# Patient Record
Sex: MALE | Race: WHITE
[De-identification: names, ages, dates, MRNs, and addresses within clinical notes are randomized per-mention and may not be internally consistent; named-entity substitution may affect disease eponyms.]

---

## 2017-11-07 ENCOUNTER — HOSPITAL ENCOUNTER (INPATIENT)
Dept: HOSPITAL 24 - ICU | Age: 67
LOS: 5 days | Discharge: ANOTHER HOSPITAL | DRG: 57 | End: 2017-11-12
Attending: INTERNAL MEDICINE | Admitting: INTERNAL MEDICINE
Payer: COMMERCIAL

## 2017-11-07 ENCOUNTER — HOSPITAL ENCOUNTER (EMERGENCY)
Dept: HOSPITAL 24 - ER | Age: 67
Discharge: HOME | End: 2017-11-07
Payer: COMMERCIAL

## 2017-11-07 VITALS — DIASTOLIC BLOOD PRESSURE: 86 MMHG | SYSTOLIC BLOOD PRESSURE: 149 MMHG

## 2017-11-07 VITALS — BODY MASS INDEX: 30.9 KG/M2

## 2017-11-07 VITALS — BODY MASS INDEX: 29.7 KG/M2

## 2017-11-07 DIAGNOSIS — H02.409: Primary | ICD-10-CM

## 2017-11-07 DIAGNOSIS — G55: ICD-10-CM

## 2017-11-07 DIAGNOSIS — G70.01: Primary | ICD-10-CM

## 2017-11-07 DIAGNOSIS — R26.81: ICD-10-CM

## 2017-11-07 DIAGNOSIS — R26.89: ICD-10-CM

## 2017-11-07 DIAGNOSIS — H02.403: ICD-10-CM

## 2017-11-07 DIAGNOSIS — M47.9: ICD-10-CM

## 2017-11-07 DIAGNOSIS — H53.2: ICD-10-CM

## 2017-11-07 DIAGNOSIS — M47.22: ICD-10-CM

## 2017-11-07 DIAGNOSIS — R06.02: ICD-10-CM

## 2017-11-07 LAB
ALBUMIN SERPL BCP-MCNC: 3.5 G/DL (ref 3.4–5)
ALBUMIN SERPL BCP-MCNC: 3.6 G/DL (ref 3.4–5)
ALP SERPL-CCNC: 83 UNITS/L (ref 46–116)
ALP SERPL-CCNC: 85 UNITS/L (ref 46–116)
ALT SERPL W P-5'-P-CCNC: 20 UNITS/L (ref 12–78)
ALT SERPL W P-5'-P-CCNC: 23 UNITS/L (ref 12–78)
AST SERPL W P-5'-P-CCNC: 16 UNITS/L (ref 15–37)
AST SERPL W P-5'-P-CCNC: 19 UNITS/L (ref 15–37)
BASOPHILS # BLD AUTO: 0.1 X10^3/UL (ref 0–0.1)
BASOPHILS # BLD AUTO: 0.2 X10^3/UL (ref 0–0.1)
BASOPHILS NFR BLD AUTO: 1 % (ref 0.2–1)
BASOPHILS NFR BLD AUTO: 2.2 % (ref 0.2–1)
BUN SERPL-MCNC: 14 MG/DL (ref 7–18)
BUN SERPL-MCNC: 16 MG/DL (ref 7–18)
CALCIUM ALBUM COR SERPL-SCNC: (no result) MG/DL (ref 8.5–10.1)
CALCIUM ALBUM COR SERPL-SCNC: (no result) MG/DL (ref 8.5–10.1)
CALCIUM ALBUM COR SERPL-SCNC: (no result) MMOL/L (ref 136–145)
CALCIUM ALBUM COR SERPL-SCNC: 140 MMOL/L (ref 136–145)
CALCIUM SERPL-MCNC: 8.8 MG/DL (ref 8.5–10.1)
CALCIUM SERPL-MCNC: 8.9 MG/DL (ref 8.5–10.1)
CHLORIDE SERPL-SCNC: 102 MMOL/L (ref 98–107)
CHLORIDE SERPL-SCNC: 103 MMOL/L (ref 98–107)
CO2 SERPL-SCNC: 28.8 MMOL/L (ref 21–32)
CO2 SERPL-SCNC: 29.4 MMOL/L (ref 21–32)
CREAT SERPL-MCNC: 1.22 MG/DL (ref 0.7–1.3)
CREAT SERPL-MCNC: 1.36 MG/DL (ref 0.7–1.3)
CRP SERPL-MCNC: 4.1 MG/L (ref 0–3)
EGFR  BLACK RACES: > 60 (ref 60–?)
EGFR  BLACK RACES: > 60 (ref 60–?)
EOSINOPHIL # BLD AUTO: 0.1 X10^3/UL (ref 0–0.2)
EOSINOPHIL # BLD AUTO: 0.1 X10^3/UL (ref 0–0.2)
EOSINOPHIL NFR BLD AUTO: 1.2 % (ref 0.9–2.9)
EOSINOPHIL NFR BLD AUTO: 1.4 % (ref 0.9–2.9)
ERYTHROCYTE [DISTWIDTH] IN BLOOD BY AUTOMATED COUNT: 13.8 % (ref 11.6–16.5)
ERYTHROCYTE [DISTWIDTH] IN BLOOD BY AUTOMATED COUNT: 14 % (ref 11.6–16.5)
ERYTHROCYTE [SEDIMENTATION RATE] IN BLOOD: 5 MM/HOUR (ref 0–15)
HCT VFR BLD AUTO: 41.3 % (ref 42–54)
HCT VFR BLD AUTO: 42.7 % (ref 42–54)
HGB BLD-MCNC: 14.2 G/DL (ref 13.5–18)
HGB BLD-MCNC: 14.7 G/DL (ref 13.5–18)
LYMPHOCYTES # BLD AUTO: 0.8 X10^3/UL (ref 1.3–2.9)
LYMPHOCYTES # BLD AUTO: 1.1 X10^3/UL (ref 1.3–2.9)
LYMPHOCYTES NFR BLD AUTO: 12.5 % (ref 21–51)
LYMPHOCYTES NFR BLD AUTO: 13.5 % (ref 21–51)
MCH RBC QN AUTO: 29.2 PG (ref 27–34)
MCH RBC QN AUTO: 29.3 PG (ref 27–34)
MCHC RBC AUTO-ENTMCNC: 34.3 G/DL (ref 33–35)
MCHC RBC AUTO-ENTMCNC: 34.5 G/DL (ref 33–35)
MCV RBC AUTO: 85 FL (ref 80–100)
MCV RBC AUTO: 85 FL (ref 80–100)
MONOCYTES # BLD AUTO: 0.5 X10^3/UL (ref 0.3–0.8)
MONOCYTES # BLD AUTO: 0.7 X10^3/UL (ref 0.3–0.8)
MONOCYTES NFR BLD AUTO: 7.7 % (ref 0–13)
MONOCYTES NFR BLD AUTO: 9.2 % (ref 0–13)
NEUTROPHILS # BLD AUTO: 4.9 X10^3/UL (ref 2.2–4.8)
NEUTROPHILS # BLD AUTO: 5.9 X10^3/UL (ref 2.2–4.8)
NEUTROPHILS NFR BLD AUTO: 73.7 % (ref 42–75)
NEUTROPHILS NFR BLD AUTO: 77.6 % (ref 42–75)
PLATELET # BLD: 226 X10^3/UL (ref 150–450)
PLATELET # BLD: 236 X10^3/UL (ref 150–450)
PMV BLD AUTO: 9.6 FL (ref 7.4–11)
PMV BLD AUTO: 9.8 FL (ref 7.4–11)
PROT SERPL-MCNC: 7.7 G/DL (ref 6.4–8.2)
PROT SERPL-MCNC: 8.1 G/DL (ref 6.4–8.2)
RBC # BLD AUTO: 4.86 X10^6/UL (ref 4.7–6)
RBC # BLD AUTO: 5.03 X10^6/UL (ref 4.7–6)
SODIUM SERPL-SCNC: 139 MMOL/L (ref 136–145)
SODIUM SERPL-SCNC: 141 MMOL/L (ref 136–145)
TSH SERPL DL<=0.05 MIU/L-ACNC: 0.65 UIU/ML (ref 0.36–3.74)
WBC NRBC COR # BLD AUTO: 6.4 X10^3/UL (ref 3.6–10)
WBC NRBC COR # BLD AUTO: 8 X10^3/UL (ref 3.6–10)

## 2017-11-07 PROCEDURE — 93010 ELECTROCARDIOGRAM REPORT: CPT

## 2017-11-07 PROCEDURE — 83519 RIA NONANTIBODY: CPT

## 2017-11-07 PROCEDURE — 93005 ELECTROCARDIOGRAM TRACING: CPT

## 2017-11-07 PROCEDURE — 82607 VITAMIN B-12: CPT

## 2017-11-07 PROCEDURE — 70450 CT HEAD/BRAIN W/O DYE: CPT

## 2017-11-07 PROCEDURE — 99283 EMERGENCY DEPT VISIT LOW MDM: CPT

## 2017-11-07 PROCEDURE — 72131 CT LUMBAR SPINE W/O DYE: CPT

## 2017-11-07 PROCEDURE — 80053 COMPREHEN METABOLIC PANEL: CPT

## 2017-11-07 PROCEDURE — 87070 CULTURE OTHR SPECIMN AEROBIC: CPT

## 2017-11-07 PROCEDURE — 85652 RBC SED RATE AUTOMATED: CPT

## 2017-11-07 PROCEDURE — 85025 COMPLETE CBC W/AUTO DIFF WBC: CPT

## 2017-11-07 PROCEDURE — 86255 FLUORESCENT ANTIBODY SCREEN: CPT

## 2017-11-07 PROCEDURE — 74000: CPT

## 2017-11-07 PROCEDURE — 84443 ASSAY THYROID STIM HORMONE: CPT

## 2017-11-07 PROCEDURE — 87205 SMEAR GRAM STAIN: CPT

## 2017-11-07 PROCEDURE — 36415 COLL VENOUS BLD VENIPUNCTURE: CPT

## 2017-11-07 PROCEDURE — 83735 ASSAY OF MAGNESIUM: CPT

## 2017-11-07 PROCEDURE — 72125 CT NECK SPINE W/O DYE: CPT

## 2017-11-07 PROCEDURE — 94640 AIRWAY INHALATION TREATMENT: CPT

## 2017-11-07 PROCEDURE — 81001 URINALYSIS AUTO W/SCOPE: CPT

## 2017-11-07 PROCEDURE — 71010: CPT

## 2017-11-07 PROCEDURE — 97535 SELF CARE MNGMENT TRAINING: CPT

## 2017-11-07 PROCEDURE — 84439 ASSAY OF FREE THYROXINE: CPT

## 2017-11-07 PROCEDURE — 84132 ASSAY OF SERUM POTASSIUM: CPT

## 2017-11-07 PROCEDURE — 86140 C-REACTIVE PROTEIN: CPT

## 2017-11-07 PROCEDURE — 86308 HETEROPHILE ANTIBODY SCREEN: CPT

## 2017-11-07 PROCEDURE — 70552 MRI BRAIN STEM W/DYE: CPT

## 2017-11-07 RX ADMIN — POTASSIUM CHLORIDE PRN MLS/HR: 7.46 INJECTION, SOLUTION INTRAVENOUS at 23:07

## 2017-11-07 RX ADMIN — SODIUM CHLORIDE SCH ML: 9 INJECTION, SOLUTION INTRAMUSCULAR; INTRAVENOUS; SUBCUTANEOUS at 23:07

## 2017-11-07 RX ADMIN — SODIUM CHLORIDE SCH MLS/HR: 9 INJECTION, SOLUTION INTRAVENOUS at 20:57

## 2017-11-07 RX ADMIN — POTASSIUM CHLORIDE PRN MLS/HR: 7.46 INJECTION, SOLUTION INTRAVENOUS at 22:00

## 2017-11-07 NOTE — CT
History: Low back pain and degenerative disc disease.



Exam: Noncontrast CT examination of the lumbar spine.



Technique: Multiple axial CT images of the L-spine were performed with sagittal and coronal reformatt
ed CT images of the lumbar spine without the benefit of IV contrast.



Comparison: No recent priors.



Findings: 



There is mild to moderate lumbar spine spondylosis and lower lumbar facet joint DJD with discal heigh
t loss at the L5-S1 level where there is also facet joint DJD and a vacuum disc phenomenon. There is 
also the suggestion of a right lateral disc protrusion at L5-S1 which is partially calcified which cr
eates severe right sided and severe left-sided foraminal narrowing/stenosis at L5-S1 with probable ne
ural impingement of the exiting nerve roots at this level. This abnormality can be followed up with l
umbar spine MRI imaging for assurance and to evaluate the degree of neural compression, however. Ther
e is no evidence for acute fracture, subluxation, or pars defect. No destructive lytic bony lesion or
 erosive endplate changes are observed there is mild to moderate bilateral SI joint DJD without joint
 erosion seen there is scattered aortic atherosclerosis. No retroperitoneal soft tissue masses or fidel
nopathy is seen. No other lumbar spine abnormalities are identified on this lumbar spine CT examinati
on.



Impression: No acute fracture or compression deformity appreciated. No subluxation seen.



Mild to moderate lumbar spine spondylosis and lower lumbar facet joint DJD with discal height loss at
 the L5-S1 level where there is also facet joint DJD and a vacuum disc phenomenon. There is also the 
suggestion of a right lateral disc protrusion at L5-S1 which is partially calcified which creates sev
ere right sided and severe left-sided foraminal narrowing/stenosis at L5-S1 with probable neural impi
ngement of the exiting nerve roots at this level. This abnormality can be followed up with outpatient
 lumbar spine MRI imaging for assurance and to evaluate the degree of neural compression, however.







Reported By:Electronically Signed by ELIANE FITZGERALD III, MD at 11/7/2017 9:50:22 AM

## 2017-11-07 NOTE — DR.GENAD
HPI





- PCP


Primary Care Physician: MAIN





- Complaint/Symptoms


Chief Complaint Doctors Comments: Patient reports that he has had numbness of 

the upper and lower extremities for several months. Lately the numbness has 

increased.  He has little feelings in the hand at times.  He has been going to 

a chiropractor for alignment and with the assumption that the numbness will 

improve but this has not been the case.  He has drooping to the eye lids that 

have increased. He denies any articulation problems or memory defect.


Chief Complaint:: NUMBNESS





- Source


History Provided: Patient





- Mode of Arrival


Mode of Arrival: Wheelchair





- Timing


Onset of Chief Complaint: 11/04/17





PMH





- PMH


Past Medical History: Yes


Past Medical History: Hypertension


Past Medical History Comment: PT STATES HE THINKS HE HAD A TIA WHEN HE WAS 42


Past Surgical History: Yes


Past Surgical History Comment: EYE SURGERY





- Family History


History of Family Medical Conditions: Yes


Family Medical History: Diabetes Mellitus, Cancer, Coronary Artery Disease, 

Hypertension





- Social History


Does patient currently use any type of tobacco product: No


Have you used tobacco products in the last 12 months: No


Type of Tobacco Use: None


Alcohol Use: None


Do you use any recreational Drugs:: No


Lives With: Spouse


Lives Where: Home





- infectious screening


In the last 2 months have you had wt loss of >10#?: NO


Have you had fever, night sweats or hemotysis?: No


Have you traveled outside the country in the last 6 months?: No


Isolation: Standard





ROS





- Review of Systems


Eyes: No Symptoms Reported


ENTM: No Symptoms Reported


Respiratoy: No Symptoms Reported


Cardiovascular: No Symptoms Reported


Gastrointestinal/Abdominal: No Symptoms Reported


Genitourinary: No Symptoms Reported


Neurological: See HPI, Weakness, Problems Walking


Musculoskeletal: Foot


Integumentary: No Symptoms Reported


Hematologic/Lymphatic: No Symptoms Reported


Endocrine: No Symptoms Reported


Psychiatric: No Symptoms Reported


All Other Systems: Reviewed and Negative





PE





- Vital Signs


Vitals: 


 





Temperature                      97.5 F


Pulse Rate [Apical]              56


Pulse Rate                       61


Respiratory Rate                 22


Blood Pressure [Left Arm]        149/86


Blood Pressure                   131/86


O2 Sat by Pulse Oximetry         95











- General


Limitations: No Limitations


General Appearance: Alert





- Head


Head Exam: Normal Inspection, Atraumatic





- Eyes


Eye exam: Normal Appearance, PERRL, EOMI





- ENT


ENT Exam: Normal Exam


External Ear Exam: Normal External Inspection


TM/Canal Exam: Bilateral Normal


Nose Exam: Normal Nose Exam


Mouth Exam: Normal Inspection


Throat Exam: Normal Inspection





- Neck


Neck Exam: Normal Inspection, Full ROM





- Chest


Chest Inspection: Normal Inspection





- Respiratory


Respiratory Exam: Normal Lung Sounds Bilat


Respiratory Exam: Bilateral Clear to Auscultation





- Cardiovascular


Cardiovascular Exam: Regular Rate, Normal Rhythm





- Abdominal Exam


Abdominal Exam: Normal Inspection


Abdominal Tenderness: negative: RUQ, RLQ, LUQ, LLQ, Epigastrium, Suprapubic, 

Diffuse, Mild, Moderate, Severe, Other





- Extremities


Extremities Exam: Normal Inspection





- Back


Back Exam: Normal Inspection, Full ROM





- Neurologic


Neurological Exam: Alert, Oriented X3, CN II-XII Intact





- Psychiatric


Psychiatric Exam: Normal Affect





- Skin


Skin Exam: Warm, Dry, Intact





ROR





- Labs Reviewed


Result Diagrams: 


 11/07/17 08:02





 11/07/17 08:02


Laboratory: 


 











WBC  6.4 X10^3/uL (3.6-10.0)   11/07/17  08:02    


 


RBC  5.03 X10^6/uL (4.7-6.0)   11/07/17  08:02    


 


Hgb  14.7 g/dL (13.5-18.0)   11/07/17  08:02    


 


Hct  42.7 % (42.0-54.0)   11/07/17  08:02    


 


MCV  85.0 fL (80.0-100.0)   11/07/17  08:02    


 


MCH  29.3 pg (27.0-34.0)   11/07/17  08:02    


 


MCHC  34.5 g/dL (33.0-35.0)   11/07/17  08:02    


 


RDW  14.0 % (11.6-16.5)   11/07/17  08:02    


 


Plt Count  226 X10^3/uL (150.0-450.0)   11/07/17  08:02    


 


MPV  9.8 fL (7.4-11.0)   11/07/17  08:02    


 


Neut %  77.6 % (42.0-75.0)  H  11/07/17  08:02    


 


Lymph %  12.5 % (21.0-51.0)  L  11/07/17  08:02    


 


Mono %  7.7 % (0.0-13.0)   11/07/17  08:02    


 


Eos %  1.2 % (0.9-2.9)   11/07/17  08:02    


 


Baso %  1.0 % (0.2-1.0)   11/07/17  08:02    


 


Neut #  4.9 x10^3/uL (2.2-4.8)  H  11/07/17  08:02    


 


Lymph #  0.8 X10^3/uL (1.3-2.9)  L  11/07/17  08:02    


 


Mono #  0.5 x10^3/uL (0.3-0.8)   11/07/17  08:02    


 


Eos #  0.1 x10^3/uL (0.0-0.2)   11/07/17  08:02    


 


Baso #  0.1 X10^3/uL (0.0-0.1)   11/07/17  08:02    


 


Absolute Nucleated RBC  0.1 /100WBC  11/07/17  08:02    


 


Sodium  139 mmol/L (136-145)   11/07/17  08:02    


 


Corrected Sodium  140 mmol/L (136-145)   11/07/17  08:02    


 


Potassium  3.6 mmol/L (3.5-5.1)   11/07/17  08:02    


 


Chloride  102 mmol/L ()   11/07/17  08:02    


 


Carbon Dioxide  28.8 mmol/L (21-32)   11/07/17  08:02    


 


BUN  14 mg/dL (7-18)   11/07/17  08:02    


 


Creatinine  1.22 mg/dL (0.70-1.30)   11/07/17  08:02    


 


Est GFR (MDRD) Af Amer  > 60  (>60)   11/07/17  08:02    


 


Est GFR (MDRD) Non-Af  > 60  (>60)   11/07/17  08:02    


 


Glucose  134 mg/dL (65-99)  H  11/07/17  08:02    


 


Calcium  8.8 mg/dL (8.5-10.1)   11/07/17  08:02    


 


Corrected Calcium  TNP   11/07/17  08:02    


 


Total Bilirubin  0.50 mg/dL (0.2-1.0)   11/07/17  08:02    


 


AST  19 Units/L (15-37)   11/07/17  08:02    


 


ALT  23 Units/L (12-78)   11/07/17  08:02    


 


Alkaline Phosphatase  85 Units/L ()   11/07/17  08:02    


 


Total Protein  8.1 g/dL (6.4-8.2)   11/07/17  08:02    


 


Albumin  3.6 g/dL (3.4-5.0)   11/07/17  08:02    


 


Globulin  4.5 g/dL (2.5-4.5)   11/07/17  08:02    


 


Albumin/Globulin Ratio  0.8 Ratio (1.1-2.1)  L  11/07/17  08:02    


 


TSH 3rd Generation  0.651 uIU/mL (0.358-3.74)   11/07/17  08:02    














- XRAY


XRAY Interpreted by: Radiologist (CT Brain: o acute intraparenchymal hemorrhage 

or mass can be identified.  No extra-axial fluid collections are seen.  No 

alteration in the attenuation of the brain parenchyma can be identified to 

suggest acute or subacute ischemic change.  The ventricular system is symmetric 

and nondilated..  The extracranial structures are grossly unremarkable. CT 

Cervical Spine: Moderate degenerative changes. CT Lumbar Spine: Mild to 

moderate lumbar spine spondylosis and lower lumbar facet joint DJD with discal 

height loss at the L5-S1 level where there is also facet joint DJD and a vacuum 

disc phenomenon. There is also the suggestion of a right lateral disc 

protrusion at L5-S1 which is partially calcified which creates severe right 

sided and severe left sided foraminal narrowing/stenosis at L5-S1 with prob 

able neural impingement of the exiting nerve roots at this level.)





- Diagnosis


Discharge Problem: 


 Ptosis of eyelid, bilateral, Spondylosis of lumbar spine, L5-S1 nerve root 

impingement





DJD (degenerative joint disease) of cervical spine


Qualifiers:


 Spinal osteoarthritis complication: with radiculopathy Qualified Code(s): 

M47.22 - Other spondylosis with radiculopathy, cervical region








- Discharge Plan


Condition: Stable





- Follow ups/Referrals


Follow ups/Referrals: 


Nagi Rutledge [Primary Care Provider] - 3 days





- Instructions

## 2017-11-07 NOTE — CT
HISTORY: Hypertension, altered mental status, and possible CVA



Study:  CT brain without contrast



Comparison: None



Technique:

Multiple axial images of the brain were obtained from the skull base to the vertex without administra
tion of IV contrast.



Findings: 



No acute intraparenchymal hemorrhage or mass can be identified.  No extra-axial fluid collections are
 seen.  No alteration in the attenuation of the brain parenchyma can be identified to suggest acute o
r subacute ischemic change. The ventricular system is symmetric and nondilated.  The extracranial str
uctures are grossly unremarkable. If the patient's symptoms persist, follow-up brain MRI imaging is s
uggested to exclude an acute CVA.



IMPRESSION:

 

1.  No acute intracranial process can be identified.











Reported By:Electronically Signed by ELIANE FITZGERALD III, MD at 11/7/2017 9:45:22 AM

## 2017-11-07 NOTE — CT
HISTORY: Hypertension, possible TIA 20 years ago. Patient has involuntary muscle movement and jerking
 gradually worsening over 5 days.



Study: CT cervical spine without contrast



Comparison: No priors



Technique: Multiple axial images of the cervical spine were obtained from the skull base to the thora
cic inlet without administration of IV contrast.  Sagittal and coronal reformats were performed and r
eviewed. Dose reduction techniques utilized automatic exposure control.



Findings:



Alignment of the cervical spine is maintained.  No evidence for acute cortical disruption or subluxat
ion can be seen.  The central canal remains free of compromise from bony fragments or significant sof
t tissue encroachment.  The posterior elements appear unremarkable.  The prevertebral soft tissues ar
e normal in their appearance.  In addition, the surrounding paraspinous soft tissues are unremarkable
.Moderate degenerative changes of the cervical spine are incidentally noted.



IMPRESSION:

 

1.  No evidence for traumatic injury of the cervical spine.







Reported By:Electronically Signed by JEANNE MAURICE MD at 11/7/2017 9:59:28 AM

## 2017-11-08 LAB
ALBUMIN SERPL BCP-MCNC: 3.5 G/DL (ref 3.4–5)
ALP SERPL-CCNC: 84 UNITS/L (ref 46–116)
ALT SERPL W P-5'-P-CCNC: 21 UNITS/L (ref 12–78)
AST SERPL W P-5'-P-CCNC: 23 UNITS/L (ref 15–37)
BASOPHILS # BLD AUTO: 0.1 X10^3/UL (ref 0–0.1)
BASOPHILS NFR BLD AUTO: 0.8 % (ref 0.2–1)
BUN SERPL-MCNC: 12 MG/DL (ref 7–18)
CALCIUM ALBUM COR SERPL-SCNC: (no result) MG/DL (ref 8.5–10.1)
CALCIUM ALBUM COR SERPL-SCNC: (no result) MMOL/L (ref 136–145)
CALCIUM SERPL-MCNC: 8.7 MG/DL (ref 8.5–10.1)
CHLORIDE SERPL-SCNC: 104 MMOL/L (ref 98–107)
CO2 SERPL-SCNC: 25.6 MMOL/L (ref 21–32)
CREAT SERPL-MCNC: 1.04 MG/DL (ref 0.7–1.3)
CRP SERPL-MCNC: 5.6 MG/L (ref 0–3)
EGFR  BLACK RACES: > 60 (ref 60–?)
EOSINOPHIL # BLD AUTO: 0.1 X10^3/UL (ref 0–0.2)
EOSINOPHIL NFR BLD AUTO: 0.8 % (ref 0.9–2.9)
ERYTHROCYTE [DISTWIDTH] IN BLOOD BY AUTOMATED COUNT: 14 % (ref 11.6–16.5)
ERYTHROCYTE [SEDIMENTATION RATE] IN BLOOD: 5 MM/HOUR (ref 0–15)
HCT VFR BLD AUTO: 41.9 % (ref 42–54)
HGB BLD-MCNC: 14.6 G/DL (ref 13.5–18)
LYMPHOCYTES # BLD AUTO: 0.7 X10^3/UL (ref 1.3–2.9)
LYMPHOCYTES NFR BLD AUTO: 7.1 % (ref 21–51)
MCH RBC QN AUTO: 29.6 PG (ref 27–34)
MCHC RBC AUTO-ENTMCNC: 34.9 G/DL (ref 33–35)
MCV RBC AUTO: 84.9 FL (ref 80–100)
MONOCYTES # BLD AUTO: 0.6 X10^3/UL (ref 0.3–0.8)
MONOCYTES NFR BLD AUTO: 6.4 % (ref 0–13)
NEUTROPHILS # BLD AUTO: 8.2 X10^3/UL (ref 2.2–4.8)
NEUTROPHILS NFR BLD AUTO: 84.9 % (ref 42–75)
PLATELET # BLD: 219 X10^3/UL (ref 150–450)
PMV BLD AUTO: 10.2 FL (ref 7.4–11)
PROT SERPL-MCNC: 7.9 G/DL (ref 6.4–8.2)
RBC # BLD AUTO: 4.94 X10^6/UL (ref 4.7–6)
SODIUM SERPL-SCNC: 139 MMOL/L (ref 136–145)
T4 FREE SERPL-MCNC: 1.22 NG/DL (ref 0.76–1.46)
TSH SERPL DL<=0.05 MIU/L-ACNC: 0.64 UIU/ML (ref 0.36–3.74)
VIT B12 SERPL-MCNC: 1714 PG/ML (ref 193–986)
WBC NRBC COR # BLD AUTO: 9.7 X10^3/UL (ref 3.6–10)

## 2017-11-08 RX ADMIN — PYRIDOSTIGMINE BROMIDE SCH MG: 60 TABLET ORAL at 20:59

## 2017-11-08 RX ADMIN — AMLODIPINE BESYLATE SCH MG: 10 TABLET ORAL at 10:40

## 2017-11-08 RX ADMIN — SODIUM CHLORIDE SCH: 9 INJECTION, SOLUTION INTRAMUSCULAR; INTRAVENOUS; SUBCUTANEOUS at 08:16

## 2017-11-08 RX ADMIN — SODIUM CHLORIDE SCH MLS/HR: 9 INJECTION, SOLUTION INTRAVENOUS at 20:59

## 2017-11-08 RX ADMIN — SODIUM CHLORIDE SCH MLS/HR: 9 INJECTION, SOLUTION INTRAVENOUS at 08:30

## 2017-11-08 RX ADMIN — ACETAMINOPHEN PRN MG: 325 TABLET, FILM COATED ORAL at 10:40

## 2017-11-08 RX ADMIN — PYRIDOSTIGMINE BROMIDE SCH MG: 60 TABLET ORAL at 10:40

## 2017-11-08 RX ADMIN — LOSARTAN POTASSIUM AND HYDROCHLOROTHIAZIDE SCH TAB: 50; 12.5 TABLET, FILM COATED ORAL at 10:40

## 2017-11-08 RX ADMIN — POTASSIUM CHLORIDE PRN MLS/HR: 7.46 INJECTION, SOLUTION INTRAVENOUS at 08:30

## 2017-11-08 RX ADMIN — POTASSIUM CHLORIDE PRN MLS/HR: 7.46 INJECTION, SOLUTION INTRAVENOUS at 10:43

## 2017-11-08 RX ADMIN — PYRIDOSTIGMINE BROMIDE SCH MG: 60 TABLET ORAL at 15:18

## 2017-11-08 RX ADMIN — SODIUM CHLORIDE SCH: 9 INJECTION, SOLUTION INTRAMUSCULAR; INTRAVENOUS; SUBCUTANEOUS at 21:12

## 2017-11-08 RX ADMIN — POTASSIUM CHLORIDE PRN MLS/HR: 7.46 INJECTION, SOLUTION INTRAVENOUS at 00:18

## 2017-11-08 RX ADMIN — SODIUM CHLORIDE SCH: 9 INJECTION, SOLUTION INTRAMUSCULAR; INTRAVENOUS; SUBCUTANEOUS at 15:10

## 2017-11-08 NOTE — MRI
STUDY: MRI OF THE BRAIN WITHOUT AND WITH GADOLINIUM



HISTORY:  Acute diplopia. Uncontrollable involuntary severe jerking of limbs with numbness and tingli
ng throughout the entire body.



Technique: Multiplanar multi-sequence MRI of the brain was obtained utilizing standard departmental p
rotocol. Sagittal and axial T1, axial T2, FLAIR, diffusion (DWI/ADC) images through the brain were pe
rformed. 



20 cc of Omniscan was administered intravenously without reported complication following acquisition 
of informed written consent. Post gadolinium axial and coronal T1 weighted images were also performed
 and reviewed.



Comparison: Head CT from November 7, 2017.



Findings:



Pre gadolinium brain: The sulci, cisterns and ventricles are prominent consistent with mild diffuse v
olume loss. There are confluent and scattered foci of T2 prolongation in the periventricular and subc
ortical white matter of both hemispheres. This is a nonspecific finding which likely represents micro
angiopathic change in a patient of this age. 



There is a punctate focus of decreased diffusion versus T2 shine through in the right post central gy
jayshree near the vertex. There is no evidence of acute hemorrhage, mass, mass effect, or midline shift. T
here are no abnormal intra-axial or extra-axial fluid collections.



The major intracranial vascular flow voids appear intact. The vertebral arteries are codominant. 



Post gadolinium brain: Following the uneventful administration of intravenous gadolinium, there is no
 evidence of abnormal parenchymal or leptomeningeal enhancement.



IMPRESSION:

 

1.  Punctate focus of decreased diffusion versus T2 shine through in the superior medial aspect of th
e post central gyrus on the right. This may represent an evolving acute to subacute infarct. Clinical
 correlation for infarct timing is recommended.



2.  Nonspecific white matter change and volume loss.







Reported By:Electronically Signed by JOSE LUIS CONNOR MD at 11/8/2017 11:55:55 AM

## 2017-11-09 LAB
ALBUMIN SERPL BCP-MCNC: 3.4 G/DL (ref 3.4–5)
ALP SERPL-CCNC: 79 UNITS/L (ref 46–116)
ALT SERPL W P-5'-P-CCNC: 26 UNITS/L (ref 12–78)
AST SERPL W P-5'-P-CCNC: 54 UNITS/L (ref 15–37)
BASOPHILS # BLD AUTO: 0.1 X10^3/UL (ref 0–0.1)
BASOPHILS NFR BLD AUTO: 0.7 % (ref 0.2–1)
BUN SERPL-MCNC: 18 MG/DL (ref 7–18)
CALCIUM ALBUM COR SERPL-SCNC: (no result) MG/DL (ref 8.5–10.1)
CALCIUM ALBUM COR SERPL-SCNC: 142 MMOL/L (ref 136–145)
CALCIUM SERPL-MCNC: 9.3 MG/DL (ref 8.5–10.1)
CHLORIDE SERPL-SCNC: 105 MMOL/L (ref 98–107)
CO2 SERPL-SCNC: 22.1 MMOL/L (ref 21–32)
CREAT SERPL-MCNC: 1.2 MG/DL (ref 0.7–1.3)
EGFR  BLACK RACES: > 60 (ref 60–?)
EOSINOPHIL # BLD AUTO: 0 X10^3/UL (ref 0–0.2)
EOSINOPHIL NFR BLD AUTO: 0 % (ref 0.9–2.9)
ERYTHROCYTE [DISTWIDTH] IN BLOOD BY AUTOMATED COUNT: 14.1 % (ref 11.6–16.5)
HCT VFR BLD AUTO: 42.7 % (ref 42–54)
HGB BLD-MCNC: 14.6 G/DL (ref 13.5–18)
LYMPHOCYTES # BLD AUTO: 0.4 X10^3/UL (ref 1.3–2.9)
LYMPHOCYTES NFR BLD AUTO: 2.3 % (ref 21–51)
MCH RBC QN AUTO: 29 PG (ref 27–34)
MCHC RBC AUTO-ENTMCNC: 34.1 G/DL (ref 33–35)
MCV RBC AUTO: 85 FL (ref 80–100)
MONOCYTES # BLD AUTO: 0.4 X10^3/UL (ref 0.3–0.8)
MONOCYTES NFR BLD AUTO: 2.3 % (ref 0–13)
NEUTROPHILS # BLD AUTO: 16.5 X10^3/UL (ref 2.2–4.8)
NEUTROPHILS NFR BLD AUTO: 94.7 % (ref 42–75)
NEUTS BAND NFR BLD: 2 % (ref 0–10)
PLAT MORPH BLD: NORMAL
PLATELET # BLD: 242 X10^3/UL (ref 150–450)
PMV BLD AUTO: 10.1 FL (ref 7.4–11)
PROT SERPL-MCNC: 7.9 G/DL (ref 6.4–8.2)
RBC # BLD AUTO: 5.02 X10^6/UL (ref 4.7–6)
RBC MORPH BLD: NORMAL
SODIUM SERPL-SCNC: 140 MMOL/L (ref 136–145)
WBC NRBC COR # BLD AUTO: 17.4 X10^3/UL (ref 3.6–10)

## 2017-11-09 RX ADMIN — PYRIDOSTIGMINE BROMIDE SCH MG: 60 TABLET ORAL at 17:31

## 2017-11-09 RX ADMIN — LOSARTAN POTASSIUM AND HYDROCHLOROTHIAZIDE SCH TAB: 50; 12.5 TABLET, FILM COATED ORAL at 09:31

## 2017-11-09 RX ADMIN — PYRIDOSTIGMINE BROMIDE SCH MG: 60 TABLET ORAL at 05:23

## 2017-11-09 RX ADMIN — PYRIDOSTIGMINE BROMIDE SCH MG: 60 TABLET ORAL at 20:14

## 2017-11-09 RX ADMIN — ACETAMINOPHEN PRN MG: 325 TABLET, FILM COATED ORAL at 12:40

## 2017-11-09 RX ADMIN — SODIUM CHLORIDE SCH: 9 INJECTION, SOLUTION INTRAMUSCULAR; INTRAVENOUS; SUBCUTANEOUS at 05:30

## 2017-11-09 RX ADMIN — SODIUM CHLORIDE SCH MLS/HR: 9 INJECTION, SOLUTION INTRAVENOUS at 15:31

## 2017-11-09 RX ADMIN — SODIUM CHLORIDE SCH: 9 INJECTION, SOLUTION INTRAMUSCULAR; INTRAVENOUS; SUBCUTANEOUS at 14:01

## 2017-11-09 RX ADMIN — SODIUM CHLORIDE SCH MLS/HR: 9 INJECTION, SOLUTION INTRAVENOUS at 23:34

## 2017-11-09 RX ADMIN — PYRIDOSTIGMINE BROMIDE SCH MG: 60 TABLET ORAL at 23:29

## 2017-11-09 RX ADMIN — SODIUM CHLORIDE SCH: 9 INJECTION, SOLUTION INTRAMUSCULAR; INTRAVENOUS; SUBCUTANEOUS at 22:09

## 2017-11-09 RX ADMIN — AMLODIPINE BESYLATE SCH MG: 10 TABLET ORAL at 09:32

## 2017-11-09 RX ADMIN — PYRIDOSTIGMINE BROMIDE SCH MG: 60 TABLET ORAL at 12:40

## 2017-11-09 NOTE — DR.H&P
H&P





- History & Physical for Day of:


H&P Date: 11/07/17





- Chief Complaint


Chief Complaint: acute diplopia, weakness





- Allergies


Allergies/Adverse Reactions: 


 Allergies











Allergy/AdvReac Type Severity Reaction Status Date / Time


 


No Known Drug Allergies Allergy   Verified 11/07/17 08:33














- History of Present Illness


History of Present Illness:  is a 67 year old patient of ours who is a 

direct admission from our office.  He presented with complaints of double vision

, unsteady balance, drooping of the eyelids and inability to keep them open, 

tingling of the upper and lower extremities with associated weakness, inability 

to hold head erect, and shortness of breath. On examination, bilateral lungs 

are noted with course rhonchi. Subcostal retractions noted. Abdomen is round, 

soft, and non-tender with normal bowel sounds in all quadrants. Patient is 

noted with moderate weakness to bilateral upper and lower extremities. Patient 

was noted with 2/5 power when head/neck flexion and extension was examined. 

Patient reports diplopia at rest. Ptosis is noted at rest and continues after 

sustained upgaze and repeated blinking. Patient reports being seen in the ER 

earlier this morning. A brain CT was obtained in the ER and reported no acute 

intracranial process. A CT of the cervical spine reported no evidence for 

traumatic injury of the cervical spine. A Lumbar spine CT revealed mild to 

moderate lumbar spine spnodylosis and lower lumbar facet joint DJD with discal 

height loss at the L5-S1 level where there is also facet joint DJD and vacuum 

disc phenomenon. There is also the suggestion of a right lateral disc 

protrusion at L5-S1 which is partially calcified which creates severe right 

sided and left sided foraminal narrowing/stenosis at L5-S1 with probable neural 

impingement of the exiting nerve roots at this level. We admitted patient to 

the hospital for acute diplopia. We will obtain a CBC, CMP, ESR, AND CRP on 

admission and repeat in the morning. We plan to obtain a brain MRI with 

contrast in the morning.





- Past Medical History


Past Medical History: Hypertension





- Past Surgical History


Surgical History: No History





- Family History


Family Medical History: Diabetes Mellitus, Cancer, Coronary Artery Disease, 

Hypertension





- Social History


Does patient currently use any type of tobacco product: No


Have you used tobacco products in the last 12 months: No


Type of Tobacco Use: None


Alcohol Use: None


Drug Use: None





- Review of Systems


Constitutional: No Symptoms Reported, Weakness.  denies: Fever, Chills, Sweats, 

Malaise, Other


Eyes: See HPI, Vision Change, Other (ptosis bilateral eyelids )


ENT: Nose Discharge, Nose Congestion.  denies: Ear Pain, Ear Discharge, Nose 

Pain, Mouth Pain, Mouth Swelling, Throat Pain, Throat Swelling


Respiratory: See HPI, Shortness of Breath, SOB with Excertion.  denies: 

Hemoptysis, Sputum, Wheezing


Cardiovascular: No Symptoms Reported.  denies: Chest Pain, Palpitations, 

Orthopnea, Paroxysmal Noc. Dyspnea, Edema, Light Headedness, Other


Gastrointestinal: No Symptoms Reported.  denies: Nausea, Vomiting, Abdominal 

Pain, Diarrhea, Constipation, Melena, Hematochezia, Other


Genitourinary: No Symptoms Reported.  denies: Dysuria, Frequency, Incontinence, 

Hematuria, Retention, Other


Musculoskeletal: Other (bilateral upper and lower extremity weakness, unstable 

gait )


Skin: No Symptoms Reported.  denies: Rash, Lesions, Jaundice, Bruising, Wound, 

Ecchymosis, Other


Neurological: See HPI, Weakness, Numbness, Incoordination





- Physical Exam


Vital Signs: 


 





Temperature                      98.4 F


Pulse Rate [Left Brachial]       79


Pulse Rate                       63


Respiratory Rate                 28


Blood Pressure [Left Arm]        149/78


Blood Pressure                   149/86


O2 Sat by Pulse Oximetry         96








Oriented: Normal


Eyes: Blurred Vision, Diplopia, Other (ptosis )


Ear: Normal.  negative: Right, Left, Swelling, Ecchymosis, Hemotypanum, Abrasion

, Laceration


Nose: Discharge.  negative: Normal, Injected, Blood, Other


Throat: Normal


Respiratory: Rhonchi Throughout


Cardiovascular: Normal.  negative: Tachycardia, Bradycardia, Irregular, S3, S4, 

Murmur, Edema, Other


: Normal.  negative: Dysuria, Hematuria, Frequency, Discharge, Testicular Pain

, Bleeding, Pregnant, Other


Auscultation: Bowel Sounds: Normal


Palpation: Normal


Tenderness: Normal.  negative: Rebound, Guarding, Rigidity


Skin: Normal


Musculoskeletal: Right, Left, Arm, Leg, Back:Lumbar, Tender, Instability


Psychiatric: Normal


Mood Description: Calm


Affect: Normal


Speech Pattern: Clear





- Assessment/Plan


(1) Diplopia


Status: Acute   


Plan: BRAIN MRI WITH CONTRAST, CONTINUE TO MONITOR   





(2) Ptosis of eyelid, bilateral


Status: Acute   


Plan: BRAIN MRI WITH CONTRAST, CONTINUE TO MONITOR   





(3) Weakness generalized


Status: Acute   


Plan: BRAIN MRI WITH CONTRAST, CONTINUE TO MONITOR

## 2017-11-10 LAB
ALBUMIN SERPL BCP-MCNC: 3.3 G/DL (ref 3.4–5)
ALP SERPL-CCNC: 74 UNITS/L (ref 46–116)
ALT SERPL W P-5'-P-CCNC: 56 UNITS/L (ref 12–78)
AST SERPL W P-5'-P-CCNC: 106 UNITS/L (ref 15–37)
BASOPHILS # BLD AUTO: 0 X10^3/UL (ref 0–0.1)
BASOPHILS NFR BLD AUTO: 0.1 % (ref 0.2–1)
BUN SERPL-MCNC: 25 MG/DL (ref 7–18)
CALCIUM ALBUM COR SERPL-SCNC: 145 MMOL/L (ref 136–145)
CALCIUM ALBUM COR SERPL-SCNC: 9.7 MG/DL (ref 8.5–10.1)
CALCIUM SERPL-MCNC: 9.1 MG/DL (ref 8.5–10.1)
CHLORIDE SERPL-SCNC: 107 MMOL/L (ref 98–107)
CO2 SERPL-SCNC: 28 MMOL/L (ref 21–32)
CREAT SERPL-MCNC: 1.21 MG/DL (ref 0.7–1.3)
EGFR  BLACK RACES: > 60 (ref 60–?)
EOSINOPHIL # BLD AUTO: 0 X10^3/UL (ref 0–0.2)
EOSINOPHIL NFR BLD AUTO: 0 % (ref 0.9–2.9)
ERYTHROCYTE [DISTWIDTH] IN BLOOD BY AUTOMATED COUNT: 14.2 % (ref 11.6–16.5)
HCT VFR BLD AUTO: 42.4 % (ref 42–54)
HGB BLD-MCNC: 14.2 G/DL (ref 13.5–18)
LYMPHOCYTES # BLD AUTO: 0.3 X10^3/UL (ref 1.3–2.9)
LYMPHOCYTES NFR BLD AUTO: 1.8 % (ref 21–51)
MCH RBC QN AUTO: 28.9 PG (ref 27–34)
MCHC RBC AUTO-ENTMCNC: 33.6 G/DL (ref 33–35)
MCV RBC AUTO: 86 FL (ref 80–100)
MONOCYTES # BLD AUTO: 0.6 X10^3/UL (ref 0.3–0.8)
MONOCYTES NFR BLD AUTO: 3.1 % (ref 0–13)
NEUTROPHILS # BLD AUTO: 17.5 X10^3/UL (ref 2.2–4.8)
NEUTROPHILS NFR BLD AUTO: 95 % (ref 42–75)
NEUTS BAND NFR BLD: 2 % (ref 0–10)
PLAT MORPH BLD: NORMAL
PLATELET # BLD: 229 X10^3/UL (ref 150–450)
PMV BLD AUTO: 10.4 FL (ref 7.4–11)
PROT SERPL-MCNC: 7.6 G/DL (ref 6.4–8.2)
RBC # BLD AUTO: 4.93 X10^6/UL (ref 4.7–6)
RBC MORPH BLD: NORMAL
SODIUM SERPL-SCNC: 144 MMOL/L (ref 136–145)
WBC NRBC COR # BLD AUTO: 18.4 X10^3/UL (ref 3.6–10)

## 2017-11-10 RX ADMIN — SODIUM CHLORIDE SCH: 9 INJECTION, SOLUTION INTRAMUSCULAR; INTRAVENOUS; SUBCUTANEOUS at 17:15

## 2017-11-10 RX ADMIN — PYRIDOSTIGMINE BROMIDE SCH: 60 TABLET ORAL at 03:14

## 2017-11-10 RX ADMIN — PYRIDOSTIGMINE BROMIDE SCH MG: 60 TABLET ORAL at 09:42

## 2017-11-10 RX ADMIN — PYRIDOSTIGMINE BROMIDE SCH MG: 60 TABLET ORAL at 20:45

## 2017-11-10 RX ADMIN — SODIUM CHLORIDE SCH MLS/HR: 9 INJECTION, SOLUTION INTRAVENOUS at 20:46

## 2017-11-10 RX ADMIN — LOSARTAN POTASSIUM AND HYDROCHLOROTHIAZIDE SCH TAB: 50; 12.5 TABLET, FILM COATED ORAL at 09:43

## 2017-11-10 RX ADMIN — AMLODIPINE BESYLATE SCH MG: 10 TABLET ORAL at 09:43

## 2017-11-10 RX ADMIN — PYRIDOSTIGMINE BROMIDE SCH MG: 60 TABLET ORAL at 12:57

## 2017-11-10 RX ADMIN — PYRIDOSTIGMINE BROMIDE SCH MG: 60 TABLET ORAL at 20:44

## 2017-11-10 RX ADMIN — SODIUM CHLORIDE SCH: 9 INJECTION, SOLUTION INTRAVENOUS at 13:38

## 2017-11-10 RX ADMIN — SODIUM CHLORIDE SCH: 9 INJECTION, SOLUTION INTRAMUSCULAR; INTRAVENOUS; SUBCUTANEOUS at 06:06

## 2017-11-10 RX ADMIN — PYRIDOSTIGMINE BROMIDE SCH MG: 60 TABLET ORAL at 17:15

## 2017-11-10 NOTE — PCM.PROG
Progress Note





- Progress Note for Day of


Date: 11/08/17





- Subjective


Subjective:  WAS ADMITTED WITH ACUTE DIPLOPIA, UNSTEADY BALANCE, 

WEAKNESS, AND PTOSIS. TODAY, HE IS ALERT AND ORIENTED, LYING IN BED ON MORNING 

ROUNDS. PATIENT'S FAMILY IS AT BEDSIDE. PATIENT CONTINUES WITH MODERATED 

WEAKNESS TO UPPER AND LOWER EXTREMITIES. HE CONTINUES TO BE UNABLE TO HOLD HEAD 

ERECT. HE CONTINUES WITH INABILITY TO HOLD EYELIDS OPEN UNLESS HELD UP BY TAPE. 

PATIENT REPORTS DOUBLE VISION. LUNGS CONTINUE WITH COURSE RHONCHI. HE CONTINUES 

TO COMPLAIN OF SHORTNESS OF BREATH. HIS VITAL SIGNS THIS MORNING ARE 98.4-62-16-

99%-154/82. ABNORMAL LAB VALUES THIS MORNING INCLUDE THE FOLLOWING: HCT 41.9, 

POTASSIUM 3.4, GLUCOSE 103, CRP 5.60, A/G RATIO 0.8, VITAMIN B12 1714. WE 

OBTAINED A BRAIN MRI WITH CONTRAST TODAY. IT REPORTED PUNCTATE FOCUS OF 

DECREASED DIFFUSION VERSUS T2 SHINE THROUGH IN THE SUPERIOR MEDIAL ASPECT OF 

THE POST CENTRAL GYRUS ON THE RIGHT. THIS MAY REPRESENT AN EVOLVING ACUTE TO 

SUBACUTE INFARCT. CLINICAL CORRELATION FOR INFARCT TIMING IS RECOMMENDED. 

NONSPECIFIC WHITE MATTER CHANGE AND VOLUME LOSS. WE BELIEVE THAT PATIENT IS 

EXHIBITING SIGNS AND SYMPTOMS OF MYASTHENIA GRAVIS. TODAY, WE PLAN TO START 

SOLUMEDROL 125MG IV Q8H AND MESTINON 60MG TID. WE WILL CHECK A MYASTHENIA 

GRAVIS PANEL AND AN VALE TITER. OTHERWISE, WE WILL CONTINUE TO MONITOR PATIENT 

AND FOLLOW UP WITH AM LABS.





- Past Medical Family Social History


Past Med/Fam/Surg Hx: No changes since H&P


Allergies: 


Allergies





No Known Drug Allergies Allergy (Verified 11/07/17 08:33)


 











- Review of Systems


ROS: No change since H&P





- Vital Signs and I&O's


Vital Signs: 


 





Temperature                      98.2 F


Pulse Rate [Left Brachial]       68


Pulse Rate                       63


Respiratory Rate                 22


Blood Pressure [Left Arm]        150/60


Blood Pressure                   149/86


O2 Sat by Pulse Oximetry         96








Intake and Output: 


 Intake & Output











 11/07/17 11/08/17 11/09/17 11/10/17





 11:59 11:59 11:59 11:59


 


Intake Total  1624 3812 1795


 


Output Total  950 120 


 


Balance  674 3692 1795














- Physical Exam


Oriented: Normal


Eyes: Blurred Vision, Diplopia, Other (ptosis )


Ear: Normal.  negative: Right, Left, Swelling, Ecchymosis, Hemotypanum, Abrasion

, Laceration


Nose: Discharge.  negative: Normal, Injected, Blood, Other


Throat: Normal


Respiratory: Right, Left, Generalized, Rhonchi


Cardiovascular: Normal.  negative: Tachycardia, Bradycardia, Irregular, S3, S4, 

Murmur, Edema, Other


: Normal.  negative: Dysuria, Hematuria, Frequency, Discharge, Testicular Pain

, Bleeding, Pregnant, Other


Auscultation: Bowel Sounds: Normal


Palpation: Normal


Tenderness: Normal.  negative: Rebound, Guarding, Rigidity


Skin: Normal


Musculoskeletal: Right, Left, Arm, Leg, Back:Lumbar, Tender, Instability


Psychiatric: Normal


Mood Description: Calm


Affect: Normal


Speech Pattern: Clear, Appropriate





- Laboratory and Diagnostics


Result Diagrams: 


 11/12/17 04:00





 11/12/17 04:00


Labs: 


 Laboratory











WBC  18.4 X10^3/uL (3.6-10.0)  H  11/10/17  04:45    


 


RBC  4.93 X10^6/uL (4.7-6.0)   11/10/17  04:45    


 


Hgb  14.2 g/dL (13.5-18.0)   11/10/17  04:45    


 


Hct  42.4 % (42.0-54.0)   11/10/17  04:45    


 


MCV  86.0 fL (80.0-100.0)   11/10/17  04:45    


 


MCH  28.9 pg (27.0-34.0)   11/10/17  04:45    


 


MCHC  33.6 g/dL (33.0-35.0)   11/10/17  04:45    


 


RDW  14.2 % (11.6-16.5)   11/10/17  04:45    


 


Plt Count  229 X10^3/uL (150.0-450.0)   11/10/17  04:45    


 


Plt Count Comment  Adequate  (ADEQUATE)   11/10/17  04:45    


 


MPV  10.4 fL (7.4-11.0)   11/10/17  04:45    


 


Neut %  95.0 % (42.0-75.0)  H  11/10/17  04:45    


 


Lymph %  1.8 % (21.0-51.0)  L  11/10/17  04:45    


 


Mono %  3.1 % (0.0-13.0)   11/10/17  04:45    


 


Eos %  0.0 % (0.9-2.9)  L  11/10/17  04:45    


 


Baso %  0.1 % (0.2-1.0)  L  11/10/17  04:45    


 


Neut #  17.5 x10^3/uL (2.2-4.8)  H  11/10/17  04:45    


 


Lymph #  0.3 X10^3/uL (1.3-2.9)  L  11/10/17  04:45    


 


Mono #  0.6 x10^3/uL (0.3-0.8)   11/10/17  04:45    


 


Eos #  0.0 x10^3/uL (0.0-0.2)   11/10/17  04:45    


 


Baso #  0.0 X10^3/uL (0.0-0.1)   11/10/17  04:45    


 


Absolute Nucleated RBC  0.0 /100WBC  11/10/17  04:45    


 


Total Counted  100   11/10/17  04:45    


 


Neutrophils % (Manual)  93 % (39-76)  H  11/10/17  04:45    


 


Band Neutrophils %  2 % (0-10)   11/10/17  04:45    


 


Lymphocytes % (Manual)  5 % (13-43)  L  11/10/17  04:45    


 


Plt Morphology Comment  Normal  (NORMAL)   11/10/17  04:45    


 


RBC Morphology  Normal  (NORMAL)   11/10/17  04:45    


 


ESR  5 MM/HOUR (0-15)   11/08/17  05:23    


 


Sodium  144 mmol/L (136-145)   11/10/17  04:45    


 


Corrected Sodium  145 mmol/L (136-145)   11/10/17  04:45    


 


Potassium  3.4 mmol/L (3.5-5.1)  L  11/10/17  04:45    


 


Chloride  107 mmol/L ()   11/10/17  04:45    


 


Carbon Dioxide  28.0 mmol/L (21-32)   11/10/17  04:45    


 


BUN  25 mg/dL (7-18)  H  11/10/17  04:45    


 


Creatinine  1.21 mg/dL (0.70-1.30)   11/10/17  04:45    


 


Est GFR (MDRD) Af Amer  > 60  (>60)   11/10/17  04:45    


 


Est GFR (MDRD) Non-Af  > 60  (>60)   11/10/17  04:45    


 


Glucose  146 mg/dL (65-99)  H  11/10/17  04:45    


 


Calcium  9.1 mg/dL (8.5-10.1)   11/10/17  04:45    


 


Corrected Calcium  9.7 mg/dL (8.5-10.1)   11/10/17  04:45    


 


Magnesium  1.8 mg/dL (1.7-2.9)   11/07/17  20:28    


 


Total Bilirubin  0.30 mg/dL (0.2-1.0)   11/10/17  04:45    


 


AST  106 Units/L (15-37)  H  11/10/17  04:45    


 


ALT  56 Units/L (12-78)   11/10/17  04:45    


 


Alkaline Phosphatase  74 Units/L ()   11/10/17  04:45    


 


C-Reactive Protein  5.60 mg/L (0-3.0)  H  11/08/17  05:23    


 


Total Protein  7.6 g/dL (6.4-8.2)   11/10/17  04:45    


 


Albumin  3.3 g/dL (3.4-5.0)  L  11/10/17  04:45    


 


Globulin  4.3 g/dL (2.5-4.5)   11/10/17  04:45    


 


Albumin/Globulin Ratio  0.8 Ratio (1.1-2.1)  L  11/10/17  04:45    


 


Vitamin B12  1714 pg/mL (193-986)  H  11/08/17  05:23    


 


Free T4  1.22 ng/dL (0.76-1.46)   11/08/17  05:23    


 


TSH 3rd Generation  0.643 uIU/mL (0.358-3.74)   11/08/17  05:23    


 


Acetylcholine Recept Ab  Cancelled   11/07/17  20:14    














- Plan


(1) Myasthenia gravis with (acute) exacerbation


Status: Suspected   


Plan: SOLU-MEDROL 125MG IV Q8H, MESTINON 60MG PO TID, CONTINUE TO MONITOR   





(2) Diplopia


Status: Acute   


Plan: CONTINUE TO MONITOR   





(3) Ptosis of eyelid, bilateral


Status: Acute   


Plan: CONTINUE TO MONITOR   





(4) Weakness generalized


Status: Acute   


Plan: PHYSICAL THERAPY, CONTINUE TO MONITOR

## 2017-11-11 LAB
ALBUMIN SERPL BCP-MCNC: 3.2 G/DL (ref 3.4–5)
ALP SERPL-CCNC: 72 UNITS/L (ref 46–116)
ALT SERPL W P-5'-P-CCNC: 76 UNITS/L (ref 12–78)
ANA PAT SER-IMP: (no result)
AST SERPL W P-5'-P-CCNC: 91 UNITS/L (ref 15–37)
BASOPHILS # BLD AUTO: 0 X10^3/UL (ref 0–0.1)
BASOPHILS NFR BLD AUTO: 0.1 % (ref 0.2–1)
BUN SERPL-MCNC: 27 MG/DL (ref 7–18)
CALCIUM ALBUM COR SERPL-SCNC: 143 MMOL/L (ref 136–145)
CALCIUM ALBUM COR SERPL-SCNC: 9.3 MG/DL (ref 8.5–10.1)
CALCIUM SERPL-MCNC: 8.7 MG/DL (ref 8.5–10.1)
CHLORIDE SERPL-SCNC: 104 MMOL/L (ref 98–107)
CO2 SERPL-SCNC: 29 MMOL/L (ref 21–32)
CREAT SERPL-MCNC: 1.12 MG/DL (ref 0.7–1.3)
EGFR  BLACK RACES: > 60 (ref 60–?)
EOSINOPHIL # BLD AUTO: 0 X10^3/UL (ref 0–0.2)
EOSINOPHIL NFR BLD AUTO: 0 % (ref 0.9–2.9)
ERYTHROCYTE [DISTWIDTH] IN BLOOD BY AUTOMATED COUNT: 14.2 % (ref 11.6–16.5)
HCT VFR BLD AUTO: 40.9 % (ref 42–54)
HGB BLD-MCNC: 14 G/DL (ref 13.5–18)
LYMPHOCYTES # BLD AUTO: 0.3 X10^3/UL (ref 1.3–2.9)
LYMPHOCYTES NFR BLD AUTO: 2 % (ref 21–51)
MAGNESIUM SERPL-MCNC: 2.3 MG/DL (ref 1.7–2.9)
MCH RBC QN AUTO: 29.1 PG (ref 27–34)
MCHC RBC AUTO-ENTMCNC: 34.4 G/DL (ref 33–35)
MCV RBC AUTO: 84.7 FL (ref 80–100)
MONOCYTES # BLD AUTO: 0.5 X10^3/UL (ref 0.3–0.8)
MONOCYTES NFR BLD AUTO: 3.4 % (ref 0–13)
NEUTROPHILS # BLD AUTO: 14.9 X10^3/UL (ref 2.2–4.8)
NEUTROPHILS NFR BLD AUTO: 94.5 % (ref 42–75)
NEUTS BAND NFR BLD: 2 % (ref 0–10)
PLAT MORPH BLD: NORMAL
PLATELET # BLD: 253 X10^3/UL (ref 150–450)
PMV BLD AUTO: 10.4 FL (ref 7.4–11)
PROT SERPL-MCNC: 7.1 G/DL (ref 6.4–8.2)
RBC # BLD AUTO: 4.82 X10^6/UL (ref 4.7–6)
RBC MORPH BLD: NORMAL
SODIUM SERPL-SCNC: 142 MMOL/L (ref 136–145)
WBC NRBC COR # BLD AUTO: 15.7 X10^3/UL (ref 3.6–10)

## 2017-11-11 RX ADMIN — POTASSIUM CHLORIDE PRN MLS/HR: 7.46 INJECTION, SOLUTION INTRAVENOUS at 12:16

## 2017-11-11 RX ADMIN — POTASSIUM CHLORIDE PRN MLS/HR: 7.46 INJECTION, SOLUTION INTRAVENOUS at 23:34

## 2017-11-11 RX ADMIN — SODIUM CHLORIDE SCH: 9 INJECTION, SOLUTION INTRAVENOUS at 05:36

## 2017-11-11 RX ADMIN — PYRIDOSTIGMINE BROMIDE SCH MG: 60 TABLET ORAL at 20:33

## 2017-11-11 RX ADMIN — POTASSIUM CHLORIDE PRN MLS/HR: 7.46 INJECTION, SOLUTION INTRAVENOUS at 14:13

## 2017-11-11 RX ADMIN — LOSARTAN POTASSIUM AND HYDROCHLOROTHIAZIDE SCH TAB: 50; 12.5 TABLET, FILM COATED ORAL at 09:11

## 2017-11-11 RX ADMIN — SODIUM CHLORIDE SCH: 9 INJECTION, SOLUTION INTRAVENOUS at 16:18

## 2017-11-11 RX ADMIN — PYRIDOSTIGMINE BROMIDE SCH MG: 60 TABLET ORAL at 09:10

## 2017-11-11 RX ADMIN — PYRIDOSTIGMINE BROMIDE SCH MG: 60 TABLET ORAL at 18:08

## 2017-11-11 RX ADMIN — POTASSIUM CHLORIDE PRN MLS/HR: 7.46 INJECTION, SOLUTION INTRAVENOUS at 10:45

## 2017-11-11 RX ADMIN — SODIUM CHLORIDE SCH: 9 INJECTION, SOLUTION INTRAMUSCULAR; INTRAVENOUS; SUBCUTANEOUS at 05:36

## 2017-11-11 RX ADMIN — PYRIDOSTIGMINE BROMIDE SCH MG: 60 TABLET ORAL at 14:09

## 2017-11-11 RX ADMIN — ACETAMINOPHEN PRN MG: 325 TABLET, FILM COATED ORAL at 22:39

## 2017-11-11 RX ADMIN — SODIUM CHLORIDE SCH ML: 9 INJECTION, SOLUTION INTRAMUSCULAR; INTRAVENOUS; SUBCUTANEOUS at 22:37

## 2017-11-11 RX ADMIN — POTASSIUM CHLORIDE PRN MLS/HR: 7.46 INJECTION, SOLUTION INTRAVENOUS at 22:38

## 2017-11-11 RX ADMIN — SODIUM CHLORIDE SCH: 9 INJECTION, SOLUTION INTRAMUSCULAR; INTRAVENOUS; SUBCUTANEOUS at 16:18

## 2017-11-11 RX ADMIN — SODIUM CHLORIDE SCH: 9 INJECTION, SOLUTION INTRAMUSCULAR; INTRAVENOUS; SUBCUTANEOUS at 01:36

## 2017-11-11 RX ADMIN — SODIUM CHLORIDE SCH MLS/HR: 9 INJECTION, SOLUTION INTRAVENOUS at 14:09

## 2017-11-11 RX ADMIN — AMLODIPINE BESYLATE SCH MG: 10 TABLET ORAL at 09:11

## 2017-11-11 RX ADMIN — POTASSIUM CHLORIDE PRN MLS/HR: 7.46 INJECTION, SOLUTION INTRAVENOUS at 09:15

## 2017-11-12 VITALS — DIASTOLIC BLOOD PRESSURE: 71 MMHG | SYSTOLIC BLOOD PRESSURE: 154 MMHG

## 2017-11-12 LAB
ACHR BIND AB SER-SCNC: 0.2 NMOL/L
ALBUMIN SERPL BCP-MCNC: 2.9 G/DL (ref 3.4–5)
ALP SERPL-CCNC: 60 UNITS/L (ref 46–116)
ALT SERPL W P-5'-P-CCNC: 102 UNITS/L (ref 12–78)
AST SERPL W P-5'-P-CCNC: 86 UNITS/L (ref 15–37)
BACTERIA URNS QL MICRO: NEGATIVE /HPF
BASOPHILS # BLD AUTO: 0 X10^3/UL (ref 0–0.1)
BASOPHILS NFR BLD AUTO: 0.1 % (ref 0.2–1)
BILIRUB UR QL STRIP.AUTO: NEGATIVE
BUN SERPL-MCNC: 23 MG/DL (ref 7–18)
CALCIUM ALBUM COR SERPL-SCNC: 144 MMOL/L (ref 136–145)
CALCIUM ALBUM COR SERPL-SCNC: 9.2 MG/DL (ref 8.5–10.1)
CALCIUM SERPL-MCNC: 8.3 MG/DL (ref 8.5–10.1)
CHLORIDE SERPL-SCNC: 106 MMOL/L (ref 98–107)
CLARITY UR: CLEAR
CO2 SERPL-SCNC: 34 MMOL/L (ref 21–32)
COLOR UR AUTO: (no result)
CREAT SERPL-MCNC: 0.92 MG/DL (ref 0.7–1.3)
EGFR  BLACK RACES: > 60 (ref 60–?)
EOSINOPHIL # BLD AUTO: 0 X10^3/UL (ref 0–0.2)
EOSINOPHIL NFR BLD AUTO: 0 % (ref 0.9–2.9)
ERYTHROCYTE [DISTWIDTH] IN BLOOD BY AUTOMATED COUNT: 14.1 % (ref 11.6–16.5)
GLUCOSE UR QL STRIP.AUTO: NEGATIVE
HCT VFR BLD AUTO: 39.9 % (ref 42–54)
HGB BLD-MCNC: 13.7 G/DL (ref 13.5–18)
KETONES UR QL STRIP.AUTO: NEGATIVE
LEUKOCYTE ESTERASE UR QL STRIP.AUTO: NEGATIVE
LYMPHOCYTES # BLD AUTO: 0.2 X10^3/UL (ref 1.3–2.9)
LYMPHOCYTES NFR BLD AUTO: 1.7 % (ref 21–51)
MCH RBC QN AUTO: 29.1 PG (ref 27–34)
MCHC RBC AUTO-ENTMCNC: 34.2 G/DL (ref 33–35)
MCV RBC AUTO: 85.1 FL (ref 80–100)
MONOCYTES # BLD AUTO: 0.5 X10^3/UL (ref 0.3–0.8)
MONOCYTES NFR BLD AUTO: 4.5 % (ref 0–13)
NEUTROPHILS # BLD AUTO: 9.9 X10^3/UL (ref 2.2–4.8)
NEUTROPHILS NFR BLD AUTO: 93.7 % (ref 42–75)
NITRITE UR QL STRIP.AUTO: NEGATIVE
PH UR STRIP.AUTO: 6 [PH] (ref 5–8)
PLAT MORPH BLD: NORMAL
PLATELET # BLD: 238 X10^3/UL (ref 150–450)
PMV BLD AUTO: 10.6 FL (ref 7.4–11)
PROT SERPL-MCNC: 6.5 G/DL (ref 6.4–8.2)
PROT UR QL STRIP.AUTO: NEGATIVE
RBC # BLD AUTO: 4.69 X10^6/UL (ref 4.7–6)
RBC # UR STRIP.AUTO: (no result) /UL
RBC #/AREA URNS HPF: (no result) /HPF
RBC MORPH BLD: NORMAL
SODIUM SERPL-SCNC: 143 MMOL/L (ref 136–145)
SP GR UR STRIP.AUTO: 1.02 (ref 1–1.03)
SQUAMOUS URNS QL MICRO: NEGATIVE /HPF
UROBILINOGEN UR QL STRIP.AUTO: NORMAL
WBC NRBC COR # BLD AUTO: 10.6 X10^3/UL (ref 3.6–10)

## 2017-11-12 RX ADMIN — POTASSIUM CHLORIDE PRN MLS/HR: 7.46 INJECTION, SOLUTION INTRAVENOUS at 06:20

## 2017-11-12 RX ADMIN — HYDROCODONE BITARTRATE AND ACETAMINOPHEN PRN TAB: 5; 325 TABLET ORAL at 10:28

## 2017-11-12 RX ADMIN — LOSARTAN POTASSIUM AND HYDROCHLOROTHIAZIDE SCH TAB: 50; 12.5 TABLET, FILM COATED ORAL at 08:14

## 2017-11-12 RX ADMIN — POTASSIUM CHLORIDE PRN MLS/HR: 7.46 INJECTION, SOLUTION INTRAVENOUS at 00:58

## 2017-11-12 RX ADMIN — HYDROCODONE BITARTRATE AND ACETAMINOPHEN PRN TAB: 5; 325 TABLET ORAL at 03:05

## 2017-11-12 RX ADMIN — ACETAMINOPHEN PRN MG: 325 TABLET, FILM COATED ORAL at 08:15

## 2017-11-12 RX ADMIN — AMLODIPINE BESYLATE SCH MG: 10 TABLET ORAL at 08:15

## 2017-11-12 RX ADMIN — SODIUM CHLORIDE SCH MLS/HR: 9 INJECTION, SOLUTION INTRAVENOUS at 03:05

## 2017-11-12 RX ADMIN — SODIUM CHLORIDE SCH ML: 9 INJECTION, SOLUTION INTRAMUSCULAR; INTRAVENOUS; SUBCUTANEOUS at 06:03

## 2017-11-12 RX ADMIN — PYRIDOSTIGMINE BROMIDE SCH MG: 60 TABLET ORAL at 08:15

## 2017-11-12 NOTE — RAD
Indication: Cough



Exam: Portable AP chest.



Findings: The heart is normal. The pulmonary vessels are normal. The lungs are hypoinflated there is 
overlying EKG leads and tubing artifact. No consolidation or effusion is seen.



Impression: Hypoinflation and overlying artifact limiting the exam with no acute abnormality seen.



Reported By:Electronically Signed by ASHKAN CONRAD MD at 11/12/2017 6:19:46 AM

## 2017-11-12 NOTE — PCM.PROG
Progress Note





- Progress Note for Day of


Date: 11/09/17





- Subjective


Subjective:  WAS ADMITTED WITH ACUTE DIPLOPIA, UNSTEADY BALANCE, 

WEAKNESS, AND PTOSIS. TODAY, HE IS ALERT AND ORIENTED, LYING IN BED ON MORNING 

ROUNDS. PATIENT'S FAMILY IS AT BEDSIDE. PATIENT CONTINUES WITH WEAKNESS TO 

UPPER AND LOWER EXTREMITIES, HOWEVER, SIGNIFICANT IMPROVEMENT IS NOTED SINCE 

YESTERDAY. PATIENT IS ABLE TO HOLD HEAD ERECT AND HAS GOOD USE AND CONTROL OF 

NECK WHEREAS HE DIDNT YESTERDAY. HE CONTINUES WITH PTOSIS, BUT REPORTS THAT 

DOUBLE VISION HAS RESOLVED. LUNGS CONTINUE WITH COURSE RHONCHI BILATERALLY TO 

AUSCULTATION. HE CONTINUES WITH SHORTNESS OF BREATH. HIS VITAL SIGNS THIS 

MORNING ARE 98.4-83-44-94%-160/69. ABNORMAL LAB VALUES THIS MORNING INCLUDE THE 

FOLLOWING: WBC 17.4, GLUCOSE 179, AST 54, A/G RATIO 0.8. VALE PANEL AND 

MYASTHENIA GRAVIS PANEL ARE PENDING. WE CONTINUE TO BELIEVE THAT PATIENT IS 

EXHIBITING SIGNS AND SYMPTOMS OF ACUTE MYASTHENIA GRAVIS, HOWEVER, WE ARE 

WAITING ON MYASTHENIA GRAVIS PANEL TO CONFIRM. TODAY, WE PLAN TO INCREASE 

SOLUMEDROL 125MG IV Q6H AND INCREASE MESTINON 60MG TO Q4H. OTHERWISE, WE WILL 

CONTINUE TO MONITOR PATIENT AND FOLLOW UP WITH AM LABS.





- Past Medical Family Social History


Past Med/Fam/Surg Hx: No changes since H&P


Allergies: 


Allergies





No Known Drug Allergies Allergy (Verified 11/07/17 08:33)


 











- Review of Systems


ROS: No change since H&P





- Vital Signs and I&O's


Vital Signs: 


 





Temperature                      98.7 F


Pulse Rate [Left Brachial]       67


Pulse Rate                       80


Respiratory Rate                 32


Blood Pressure [Left Arm]        154/71


Blood Pressure                   149/86


O2 Sat by Pulse Oximetry         98








Intake and Output: 


 Intake & Output











 11/10/17 11/11/17 11/12/17 11/13/17





 11:59 11:59 11:59 11:59


 


Intake Total 1795 4094 3540 


 


Output Total  225 3730 


 


Balance 1795 3869 -190 














- Physical Exam


Oriented: Normal


Eyes: Blurred Vision, Diplopia, Other (ptosis )


Ear: Normal.  negative: Right, Left, Swelling, Ecchymosis, Hemotypanum, Abrasion

, Laceration


Nose: Discharge.  negative: Normal, Injected, Blood, Other


Throat: Normal


Respiratory: Right, Left, Generalized, Rhonchi


Cardiovascular: Normal.  negative: Tachycardia, Bradycardia, Irregular, S3, S4, 

Murmur, Edema, Other


: Normal.  negative: Dysuria, Hematuria, Frequency, Discharge, Testicular Pain

, Bleeding, Pregnant, Other


Auscultation: Bowel Sounds: Normal


Palpation: Normal


Tenderness: Normal.  negative: Rebound, Guarding, Rigidity


Skin: Normal


Musculoskeletal: Right, Left, Arm, Leg, Back:Lumbar, Tender, Instability


Psychiatric: Normal


Mood Description: Calm


Affect: Normal


Speech Pattern: Clear, Appropriate





- Laboratory and Diagnostics


Result Diagrams: 


 11/12/17 04:00





 11/12/17 04:00


Labs: 


 





11/11/17 20:39   Sputum - Expectorated Sputum   Sputum Culture - Preliminary


11/11/17 20:39   Sputum - Expectorated Sputum    - Final





 Laboratory











WBC  10.6 X10^3/uL (3.6-10.0)  H  11/12/17  04:00    


 


RBC  4.69 X10^6/uL (4.7-6.0)  L  11/12/17  04:00    


 


Hgb  13.7 g/dL (13.5-18.0)   11/12/17  04:00    


 


Hct  39.9 % (42.0-54.0)  L  11/12/17  04:00    


 


MCV  85.1 fL (80.0-100.0)   11/12/17  04:00    


 


MCH  29.1 pg (27.0-34.0)   11/12/17  04:00    


 


MCHC  34.2 g/dL (33.0-35.0)   11/12/17  04:00    


 


RDW  14.1 % (11.6-16.5)   11/12/17  04:00    


 


Plt Count  238 X10^3/uL (150.0-450.0)   11/12/17  04:00    


 


Plt Count Comment  Adequate  (ADEQUATE)   11/12/17  04:00    


 


MPV  10.6 fL (7.4-11.0)   11/12/17  04:00    


 


Neut %  93.7 % (42.0-75.0)  H  11/12/17  04:00    


 


Lymph %  1.7 % (21.0-51.0)  L  11/12/17  04:00    


 


Mono %  4.5 % (0.0-13.0)   11/12/17  04:00    


 


Eos %  0.0 % (0.9-2.9)  L  11/12/17  04:00    


 


Baso %  0.1 % (0.2-1.0)  L  11/12/17  04:00    


 


Neut #  9.9 x10^3/uL (2.2-4.8)  H  11/12/17  04:00    


 


Lymph #  0.2 X10^3/uL (1.3-2.9)  L  11/12/17  04:00    


 


Mono #  0.5 x10^3/uL (0.3-0.8)   11/12/17  04:00    


 


Eos #  0.0 x10^3/uL (0.0-0.2)   11/12/17  04:00    


 


Baso #  0.0 X10^3/uL (0.0-0.1)   11/12/17  04:00    


 


Absolute Nucleated RBC  0.0 /100WBC  11/12/17  04:00    


 


Total Counted  100   11/12/17  04:00    


 


Neutrophils % (Manual)  92 % (39-76)  H  11/12/17  04:00    


 


Band Neutrophils %  2 % (0-10)   11/11/17  04:50    


 


Lymphocytes % (Manual)  5 % (13-43)  L  11/12/17  04:00    


 


Monocytes % (Manual)  3 % (4-9)  L  11/12/17  04:00    


 


Plt Morphology Comment  Normal  (NORMAL)   11/12/17  04:00    


 


RBC Morphology  Normal  (NORMAL)   11/12/17  04:00    


 


ESR  5 MM/HOUR (0-15)   11/08/17  05:23    


 


Sodium  143 mmol/L (136-145)   11/12/17  04:00    


 


Corrected Sodium  144 mmol/L (136-145)   11/12/17  04:00    


 


Potassium  3.2 mmol/L (3.5-5.1)  L  11/12/17  04:00    


 


Chloride  106 mmol/L ()   11/12/17  04:00    


 


Carbon Dioxide  34.0 mmol/L (21-32)  H  11/12/17  04:00    


 


BUN  23 mg/dL (7-18)  H  11/12/17  04:00    


 


Creatinine  0.92 mg/dL (0.70-1.30)   11/12/17  04:00    


 


Est GFR (MDRD) Af Amer  > 60  (>60)   11/12/17  04:00    


 


Est GFR (MDRD) Non-Af  > 60  (>60)   11/12/17  04:00    


 


Glucose  147 mg/dL (65-99)  H  11/12/17  04:00    


 


Calcium  8.3 mg/dL (8.5-10.1)  L  11/12/17  04:00    


 


Corrected Calcium  9.2 mg/dL (8.5-10.1)   11/12/17  04:00    


 


Magnesium  2.3 mg/dL (1.7-2.9)   11/11/17  04:50    


 


Total Bilirubin  0.50 mg/dL (0.2-1.0)   11/12/17  04:00    


 


AST  86 Units/L (15-37)  H  11/12/17  04:00    


 


ALT  102 Units/L (12-78)  H  11/12/17  04:00    


 


Alkaline Phosphatase  60 Units/L ()   11/12/17  04:00    


 


C-Reactive Protein  5.60 mg/L (0-3.0)  H  11/08/17  05:23    


 


Total Protein  6.5 g/dL (6.4-8.2)   11/12/17  04:00    


 


Albumin  2.9 g/dL (3.4-5.0)  L  11/12/17  04:00    


 


Globulin  3.6 g/dL (2.5-4.5)   11/12/17  04:00    


 


Albumin/Globulin Ratio  0.8 Ratio (1.1-2.1)  L  11/12/17  04:00    


 


Vitamin B12  1714 pg/mL (193-986)  H  11/08/17  05:23    


 


Free T4  1.22 ng/dL (0.76-1.46)   11/08/17  05:23    


 


TSH 3rd Generation  0.643 uIU/mL (0.358-3.74)   11/08/17  05:23    


 


Specimen Type  Catherized urine   11/12/17  00:59    


 


Urine Color  Pale yellow  (YELLOW)   11/12/17  00:59    


 


Urine Appearance  Clear  (CLEAR)   11/12/17  00:59    


 


Urine pH  6.0  (5.0 - 8.0)   11/12/17  00:59    


 


Ur Specific Gravity  1.020  (1.000-1.030)   11/12/17  00:59    


 


Urine Protein  Negative  (NEGATIVE)   11/12/17  00:59    


 


Urine Glucose (UA)  Negative  (NEGATIVE)   11/12/17  00:59    


 


Urine Ketones  Negative  (NEGATIVE)   11/12/17  00:59    


 


Urine Occult Blood  2+  (NEGATIVE)   11/12/17  00:59    


 


Urine Nitrite  Negative  (NEGATIVE)   11/12/17  00:59    


 


Urine Bilirubin  Negative  (NEGATIVE)   11/12/17  00:59    


 


Urine Urobilinogen  Normal  (NORMAL)   11/12/17  00:59    


 


Ur Leukocyte Esterase  Negative  (NEGATIVE)   11/12/17  00:59    


 


Urine RBC  Rare /HPF (NEGATIVE)   11/12/17  00:59    


 


Urine WBC  None seen /HPF (NEGATIVE)   11/12/17  00:59    


 


Ur Squamous Epith Cells  Negative /HPF (NEGATIVE)   11/12/17  00:59    


 


Urine Bacteria  Negative /HPF (NEGATIVE)   11/12/17  00:59    


 


Ur Culture Indicated?  No/not indicated   11/12/17  00:59    


 


VALE Screen  None detected  (None Detected)   11/08/17  05:23    


 


VALE Titer  TNP   11/08/17  05:23    


 


VALE Pattern  TNP   11/08/17  05:23    


 


AChR Muscle Binding Ab  0.2   11/08/17  05:23    


 


Striated Muscle IgG Ab  <1:40   11/08/17  05:23    


 


Acetylcholine Recept Ab  Cancelled   11/07/17  20:14    














- Plan


(1) Myasthenia gravis with (acute) exacerbation


Status: Suspected   


Plan: SOLU-MEDROL 125MG IV Q6H, MESTINON 60MG PO Q4H, CONTINUE TO MONITOR   





(2) Diplopia


Status: Acute   


Plan: CONTINUE TO MONITOR   





(3) Ptosis of eyelid, bilateral


Status: Acute   


Plan: CONTINUE TO MONITOR   





(4) Weakness generalized


Status: Acute   


Plan: PHYSICAL THERAPY, CONTINUE TO MONITOR

## 2017-11-12 NOTE — RAD
Examination: Portable KUB



History: Abdominal distention



Findings: There is moderately severe gaseous distention of the colon. Gas is present in the rectosigm
oid. The underlying visceral structures are largely obscured. There is no obvious mass or pathologic 
calcification or ascites.



Impression: Significant colon distention which may represent primary colon ileus. A distal obstructio
n is a possibility and should be clinically considered. Follow-up suggested.



Reported By:Electronically Signed by JAEL MIRANDA MD at 11/12/2017 6:18:46 AM

## 2017-11-12 NOTE — PCM.PROG
Progress Note





- Progress Note for Day of


Date: 11/10/17





- Subjective


Subjective:  WAS ADMITTED WITH ACUTE DIPLOPIA, UNSTEADY BALANCE, 

WEAKNESS, AND PTOSIS. TODAY, HE IS ALERT AND ORIENTED, LYING IN BED ON MORNING 

ROUNDS. PATIENT'S FAMILY IS AT BEDSIDE. PATIENT CONTINUES WITH WEAKNESS TO 

UPPER AND LOWER EXTREMITIES, WHICH APPEAR TO HAVE WORSENED AGAIN. PATIENT DOES 

NOT HAVE AS MUCH CONTROL OVER NECK MUSCLES AS HE DID YESTERDAY. HE CONTINUES 

WITH PTOSIS, BUT CONTINUES TO DENY DOUBLE VISION TODAY. STAFF REPORTS THAT 

AROUND 02:50 THIS MORNING, MUSCLE TWITCHING APPEARED TO BE WORSENING AND THAT 

PATIENT WAS UNABLE TO WALK TO THE BATHROOM, WHEREAS HE WAS ABLE TO LAST NIGHT 

AND YESTERDAY. PATIENT WAS UNABLE TO STAND AND REPORTS THAT HE WOULD LIKE TO 

SPEAK TO US BEFORE TAKING ANYMORE MEDICATIONS. PATIENT REPORTS THAT HE FELT AS 

IF THE MEDICATION WAS MORE BENEFICIAL TO HIM BEFORE WE INCREASED THE FREQUENCY 

OF THE MESTINON. LUNGS CONTINUE WITH COURSE RHONCHI BILATERALLY TO 

AUSCULTATION. HE CONTINUES WITH SHORTNESS OF BREATH. HIS VITAL SIGNS THIS 

MORNING ARE 98.2-68-22-96%-150/60. ABNORMAL LAB VALUES THIS MORNING INCLUDE THE 

FOLLOWING: WBC 18.4, POTASSIUM 3.4, BUN 25, GLUCOSE 146, , ALBUMIN 3.3, A

/G RATIO 0.8. VALE PANEL AND MYASTHENIA GRAVIS PANEL ARE PENDING. TODAY, WE PLAN 

TO DECREASE THE MESTINON TO 30MG PO QID. OTHERWISE, WE WILL CONTINUE TO MONITOR 

PATIENT AND FOLLOW UP WITH AM LABS.





- Past Medical Family Social History


Past Med/Fam/Surg Hx: No changes since H&P


Allergies: 


Allergies





No Known Drug Allergies Allergy (Verified 11/07/17 08:33)


 











- Review of Systems


ROS: No change since H&P





- Vital Signs and I&O's


Vital Signs: 


 





Temperature                      98.7 F


Pulse Rate [Left Brachial]       67


Pulse Rate                       80


Respiratory Rate                 32


Blood Pressure [Left Arm]        154/71


Blood Pressure                   149/86


O2 Sat by Pulse Oximetry         98








Intake and Output: 


 Intake & Output











 11/10/17 11/11/17 11/12/17 11/13/17





 11:59 11:59 11:59 11:59


 


Intake Total 1795 4094 3540 


 


Output Total  225 3730 


 


Balance 1795 3869 -190 














- Physical Exam


Oriented: Normal


Eyes: Blurred Vision, Diplopia, Other (ptosis )


Ear: Normal.  negative: Right, Left, Swelling, Ecchymosis, Hemotypanum, Abrasion

, Laceration


Nose: Discharge.  negative: Normal, Injected, Blood, Other


Throat: Normal


Respiratory: Right, Left, Generalized, Rhonchi


Cardiovascular: Normal.  negative: Tachycardia, Bradycardia, Irregular, S3, S4, 

Murmur, Edema, Other


: Normal.  negative: Dysuria, Hematuria, Frequency, Discharge, Testicular Pain

, Bleeding, Pregnant, Other


Auscultation: Bowel Sounds: Normal


Palpation: Normal


Tenderness: Normal.  negative: Rebound, Guarding, Rigidity


Skin: Normal


Musculoskeletal: Right, Left, Arm, Leg, Back:Lumbar, Tender, Instability


Psychiatric: Normal


Mood Description: Calm


Affect: Normal


Speech Pattern: Clear, Appropriate





- Laboratory and Diagnostics


Result Diagrams: 


 11/12/17 04:00





 11/12/17 04:00


Labs: 


 





11/11/17 20:39   Sputum - Expectorated Sputum   Sputum Culture - Preliminary


11/11/17 20:39   Sputum - Expectorated Sputum    - Final





 Laboratory











WBC  10.6 X10^3/uL (3.6-10.0)  H  11/12/17  04:00    


 


RBC  4.69 X10^6/uL (4.7-6.0)  L  11/12/17  04:00    


 


Hgb  13.7 g/dL (13.5-18.0)   11/12/17  04:00    


 


Hct  39.9 % (42.0-54.0)  L  11/12/17  04:00    


 


MCV  85.1 fL (80.0-100.0)   11/12/17  04:00    


 


MCH  29.1 pg (27.0-34.0)   11/12/17  04:00    


 


MCHC  34.2 g/dL (33.0-35.0)   11/12/17  04:00    


 


RDW  14.1 % (11.6-16.5)   11/12/17  04:00    


 


Plt Count  238 X10^3/uL (150.0-450.0)   11/12/17  04:00    


 


Plt Count Comment  Adequate  (ADEQUATE)   11/12/17  04:00    


 


MPV  10.6 fL (7.4-11.0)   11/12/17  04:00    


 


Neut %  93.7 % (42.0-75.0)  H  11/12/17  04:00    


 


Lymph %  1.7 % (21.0-51.0)  L  11/12/17  04:00    


 


Mono %  4.5 % (0.0-13.0)   11/12/17  04:00    


 


Eos %  0.0 % (0.9-2.9)  L  11/12/17  04:00    


 


Baso %  0.1 % (0.2-1.0)  L  11/12/17  04:00    


 


Neut #  9.9 x10^3/uL (2.2-4.8)  H  11/12/17  04:00    


 


Lymph #  0.2 X10^3/uL (1.3-2.9)  L  11/12/17  04:00    


 


Mono #  0.5 x10^3/uL (0.3-0.8)   11/12/17  04:00    


 


Eos #  0.0 x10^3/uL (0.0-0.2)   11/12/17  04:00    


 


Baso #  0.0 X10^3/uL (0.0-0.1)   11/12/17  04:00    


 


Absolute Nucleated RBC  0.0 /100WBC  11/12/17  04:00    


 


Total Counted  100   11/12/17  04:00    


 


Neutrophils % (Manual)  92 % (39-76)  H  11/12/17  04:00    


 


Band Neutrophils %  2 % (0-10)   11/11/17  04:50    


 


Lymphocytes % (Manual)  5 % (13-43)  L  11/12/17  04:00    


 


Monocytes % (Manual)  3 % (4-9)  L  11/12/17  04:00    


 


Plt Morphology Comment  Normal  (NORMAL)   11/12/17  04:00    


 


RBC Morphology  Normal  (NORMAL)   11/12/17  04:00    


 


ESR  5 MM/HOUR (0-15)   11/08/17  05:23    


 


Sodium  143 mmol/L (136-145)   11/12/17  04:00    


 


Corrected Sodium  144 mmol/L (136-145)   11/12/17  04:00    


 


Potassium  3.2 mmol/L (3.5-5.1)  L  11/12/17  04:00    


 


Chloride  106 mmol/L ()   11/12/17  04:00    


 


Carbon Dioxide  34.0 mmol/L (21-32)  H  11/12/17  04:00    


 


BUN  23 mg/dL (7-18)  H  11/12/17  04:00    


 


Creatinine  0.92 mg/dL (0.70-1.30)   11/12/17  04:00    


 


Est GFR (MDRD) Af Amer  > 60  (>60)   11/12/17  04:00    


 


Est GFR (MDRD) Non-Af  > 60  (>60)   11/12/17  04:00    


 


Glucose  147 mg/dL (65-99)  H  11/12/17  04:00    


 


Calcium  8.3 mg/dL (8.5-10.1)  L  11/12/17  04:00    


 


Corrected Calcium  9.2 mg/dL (8.5-10.1)   11/12/17  04:00    


 


Magnesium  2.3 mg/dL (1.7-2.9)   11/11/17  04:50    


 


Total Bilirubin  0.50 mg/dL (0.2-1.0)   11/12/17  04:00    


 


AST  86 Units/L (15-37)  H  11/12/17  04:00    


 


ALT  102 Units/L (12-78)  H  11/12/17  04:00    


 


Alkaline Phosphatase  60 Units/L ()   11/12/17  04:00    


 


C-Reactive Protein  5.60 mg/L (0-3.0)  H  11/08/17  05:23    


 


Total Protein  6.5 g/dL (6.4-8.2)   11/12/17  04:00    


 


Albumin  2.9 g/dL (3.4-5.0)  L  11/12/17  04:00    


 


Globulin  3.6 g/dL (2.5-4.5)   11/12/17  04:00    


 


Albumin/Globulin Ratio  0.8 Ratio (1.1-2.1)  L  11/12/17  04:00    


 


Vitamin B12  1714 pg/mL (193-986)  H  11/08/17  05:23    


 


Free T4  1.22 ng/dL (0.76-1.46)   11/08/17  05:23    


 


TSH 3rd Generation  0.643 uIU/mL (0.358-3.74)   11/08/17  05:23    


 


Specimen Type  Catherized urine   11/12/17  00:59    


 


Urine Color  Pale yellow  (YELLOW)   11/12/17  00:59    


 


Urine Appearance  Clear  (CLEAR)   11/12/17  00:59    


 


Urine pH  6.0  (5.0 - 8.0)   11/12/17  00:59    


 


Ur Specific Gravity  1.020  (1.000-1.030)   11/12/17  00:59    


 


Urine Protein  Negative  (NEGATIVE)   11/12/17  00:59    


 


Urine Glucose (UA)  Negative  (NEGATIVE)   11/12/17  00:59    


 


Urine Ketones  Negative  (NEGATIVE)   11/12/17  00:59    


 


Urine Occult Blood  2+  (NEGATIVE)   11/12/17  00:59    


 


Urine Nitrite  Negative  (NEGATIVE)   11/12/17  00:59    


 


Urine Bilirubin  Negative  (NEGATIVE)   11/12/17  00:59    


 


Urine Urobilinogen  Normal  (NORMAL)   11/12/17  00:59    


 


Ur Leukocyte Esterase  Negative  (NEGATIVE)   11/12/17  00:59    


 


Urine RBC  Rare /HPF (NEGATIVE)   11/12/17  00:59    


 


Urine WBC  None seen /HPF (NEGATIVE)   11/12/17  00:59    


 


Ur Squamous Epith Cells  Negative /HPF (NEGATIVE)   11/12/17  00:59    


 


Urine Bacteria  Negative /HPF (NEGATIVE)   11/12/17  00:59    


 


Ur Culture Indicated?  No/not indicated   11/12/17  00:59    


 


VALE Screen  None detected  (None Detected)   11/08/17  05:23    


 


VALE Titer  TNP   11/08/17  05:23    


 


VALE Pattern  TNP   11/08/17  05:23    


 


AChR Muscle Binding Ab  0.2   11/08/17  05:23    


 


Striated Muscle IgG Ab  <1:40   11/08/17  05:23    


 


Acetylcholine Recept Ab  Cancelled   11/07/17  20:14    














- Plan


(1) Myasthenia gravis with (acute) exacerbation


Status: Suspected   


Plan: SOLU-MEDROL 125MG IV Q6H, MESTINON 30MG PO QID, CONTINUE TO MONITOR   





(2) Diplopia


Status: Acute   


Plan: CONTINUE TO MONITOR   





(3) Ptosis of eyelid, bilateral


Status: Acute   


Plan: CONTINUE TO MONITOR   





(4) Weakness generalized


Status: Acute   


Plan: PHYSICAL THERAPY, CONTINUE TO MONITOR

## 2017-11-13 NOTE — PCM.PROG
Progress Note





- Progress Note for Day of


Date: 11/12/17





- Subjective


Subjective:  WAS ADMITTED WITH ACUTE DIPLOPIA, UNSTEADY BALANCE, 

WEAKNESS, AND PTOSIS. TODAY, HE IS ALERT AND ORIENTED, LYING IN BED ON MORNING 

ROUNDS. PATIENT'S FAMILY IS AT BEDSIDE. PATIENT CONTINUES WITH WEAKNESS TO 

UPPER AND LOWER EXTREMITIES, WHICH APPEAR TO HAVE SLIGHTLY IMPROVED SINCE 

YESTERDAY. PATIENT APPEARS TO HAVE REGAINED SOME STRENGTH AND CONTROL OF NECK. 

HE CONTINUES WITH PTOSIS, BUT CONTINUES TO DENY DOUBLE VISION TODAY. MUSCLE 

TWITCHING HAS DECREASED SINCE YESTERDAY. ON EXAMINATION, LUNGS CONTINUE WITH 

COURSE RHONCHI BILATERALLY TO AUSCULTATION. HE CONTINUES WITH SHORTNESS OF 

BREATH. HIS VITAL SIGNS THIS MORNING ARE 97.4-80-%-185/82. ABNORMAL LAB 

VALUES THIS MORNING INCLUDE THE FOLLOWING: WBC 15.7, HCT 40.9, POTASSIUM 3.1, 

BUN 27, GLUCOSE 135, AST 91, ALBUMIN 3.2, A/G RATIO 0.8. VALE PANEL AND 

MYASTHENIA GRAVIS PANEL ARE PENDING. TODAY, WE PLAN TO INCREASE THE MESTINON 

AGAIN TO 60MG PO QID. OTHERWISE, WE WILL CONTINUE TO MONITOR PATIENT AND FOLLOW 

UP WITH AM LABS. WE WILL CONTINUE TO HAVE PHYSICAL THERAPY WORK WITH PATIENT. 

IF PATIENT IS NOT NOTED WITH SIGNIFICANT IMPROVEMENT BY TOMORROWS ROUNDS, WE 

MAY CONSIDER TRANSFERRING PATIENT FOR A SECOND OPINION.





- Past Medical Family Social History


Past Med/Fam/Surg Hx: No changes since H&P


Allergies: 


Allergies





No Known Drug Allergies Allergy (Verified 11/07/17 08:33)


 











- Review of Systems


ROS: No change since H&P





- Vital Signs and I&O's


Vital Signs: 


 





Temperature                      98.7 F


Pulse Rate [Left Brachial]       67


Pulse Rate                       80


Respiratory Rate                 32


Blood Pressure [Left Arm]        154/71


Blood Pressure                   149/86


O2 Sat by Pulse Oximetry         98








Intake and Output: 


 Intake & Output











 11/11/17 11/12/17 11/13/17 11/14/17





 11:59 11:59 11:59 11:59


 


Intake Total 4094 3540  


 


Output Total 225 3730  


 


Balance 3869 -190  














- Physical Exam


Oriented: Normal


Eyes: Blurred Vision, Diplopia, Other (ptosis )


Ear: Normal.  negative: Right, Left, Swelling, Ecchymosis, Hemotypanum, Abrasion

, Laceration


Nose: Discharge.  negative: Normal, Injected, Blood, Other


Throat: Normal


Respiratory: Right, Left, Generalized, Rhonchi


Cardiovascular: Normal.  negative: Tachycardia, Bradycardia, Irregular, S3, S4, 

Murmur, Edema, Other


: Normal.  negative: Dysuria, Hematuria, Frequency, Discharge, Testicular Pain

, Bleeding, Pregnant, Other


Auscultation: Bowel Sounds: Normal


Palpation: Normal


Tenderness: Normal.  negative: Rebound, Guarding, Rigidity


Skin: Normal


Musculoskeletal: Right, Left, Arm, Leg, Back:Lumbar, Tender, Instability


Psychiatric: Normal


Mood Description: Calm


Affect: Normal


Speech Pattern: Clear, Appropriate





- Laboratory and Diagnostics


Result Diagrams: 


 11/12/17 04:00





 11/12/17 04:00


Labs: 


 





11/11/17 20:39   Sputum - Expectorated Sputum   Sputum Culture - Preliminary


11/11/17 20:39   Sputum - Expectorated Sputum    - Final





 Laboratory











WBC  10.6 X10^3/uL (3.6-10.0)  H  11/12/17  04:00    


 


RBC  4.69 X10^6/uL (4.7-6.0)  L  11/12/17  04:00    


 


Hgb  13.7 g/dL (13.5-18.0)   11/12/17  04:00    


 


Hct  39.9 % (42.0-54.0)  L  11/12/17  04:00    


 


MCV  85.1 fL (80.0-100.0)   11/12/17  04:00    


 


MCH  29.1 pg (27.0-34.0)   11/12/17  04:00    


 


MCHC  34.2 g/dL (33.0-35.0)   11/12/17  04:00    


 


RDW  14.1 % (11.6-16.5)   11/12/17  04:00    


 


Plt Count  238 X10^3/uL (150.0-450.0)   11/12/17  04:00    


 


Plt Count Comment  Adequate  (ADEQUATE)   11/12/17  04:00    


 


MPV  10.6 fL (7.4-11.0)   11/12/17  04:00    


 


Neut %  93.7 % (42.0-75.0)  H  11/12/17  04:00    


 


Lymph %  1.7 % (21.0-51.0)  L  11/12/17  04:00    


 


Mono %  4.5 % (0.0-13.0)   11/12/17  04:00    


 


Eos %  0.0 % (0.9-2.9)  L  11/12/17  04:00    


 


Baso %  0.1 % (0.2-1.0)  L  11/12/17  04:00    


 


Neut #  9.9 x10^3/uL (2.2-4.8)  H  11/12/17  04:00    


 


Lymph #  0.2 X10^3/uL (1.3-2.9)  L  11/12/17  04:00    


 


Mono #  0.5 x10^3/uL (0.3-0.8)   11/12/17  04:00    


 


Eos #  0.0 x10^3/uL (0.0-0.2)   11/12/17  04:00    


 


Baso #  0.0 X10^3/uL (0.0-0.1)   11/12/17  04:00    


 


Absolute Nucleated RBC  0.0 /100WBC  11/12/17  04:00    


 


Total Counted  100   11/12/17  04:00    


 


Neutrophils % (Manual)  92 % (39-76)  H  11/12/17  04:00    


 


Band Neutrophils %  2 % (0-10)   11/11/17  04:50    


 


Lymphocytes % (Manual)  5 % (13-43)  L  11/12/17  04:00    


 


Monocytes % (Manual)  3 % (4-9)  L  11/12/17  04:00    


 


Plt Morphology Comment  Normal  (NORMAL)   11/12/17  04:00    


 


RBC Morphology  Normal  (NORMAL)   11/12/17  04:00    


 


ESR  5 MM/HOUR (0-15)   11/08/17  05:23    


 


Sodium  143 mmol/L (136-145)   11/12/17  04:00    


 


Corrected Sodium  144 mmol/L (136-145)   11/12/17  04:00    


 


Potassium  3.2 mmol/L (3.5-5.1)  L  11/12/17  04:00    


 


Chloride  106 mmol/L ()   11/12/17  04:00    


 


Carbon Dioxide  34.0 mmol/L (21-32)  H  11/12/17  04:00    


 


BUN  23 mg/dL (7-18)  H  11/12/17  04:00    


 


Creatinine  0.92 mg/dL (0.70-1.30)   11/12/17  04:00    


 


Est GFR (MDRD) Af Amer  > 60  (>60)   11/12/17  04:00    


 


Est GFR (MDRD) Non-Af  > 60  (>60)   11/12/17  04:00    


 


Glucose  147 mg/dL (65-99)  H  11/12/17  04:00    


 


Calcium  8.3 mg/dL (8.5-10.1)  L  11/12/17  04:00    


 


Corrected Calcium  9.2 mg/dL (8.5-10.1)   11/12/17  04:00    


 


Magnesium  2.3 mg/dL (1.7-2.9)   11/11/17  04:50    


 


Total Bilirubin  0.50 mg/dL (0.2-1.0)   11/12/17  04:00    


 


AST  86 Units/L (15-37)  H  11/12/17  04:00    


 


ALT  102 Units/L (12-78)  H  11/12/17  04:00    


 


Alkaline Phosphatase  60 Units/L ()   11/12/17  04:00    


 


C-Reactive Protein  5.60 mg/L (0-3.0)  H  11/08/17  05:23    


 


Total Protein  6.5 g/dL (6.4-8.2)   11/12/17  04:00    


 


Albumin  2.9 g/dL (3.4-5.0)  L  11/12/17  04:00    


 


Globulin  3.6 g/dL (2.5-4.5)   11/12/17  04:00    


 


Albumin/Globulin Ratio  0.8 Ratio (1.1-2.1)  L  11/12/17  04:00    


 


Vitamin B12  1714 pg/mL (193-986)  H  11/08/17  05:23    


 


Free T4  1.22 ng/dL (0.76-1.46)   11/08/17  05:23    


 


TSH 3rd Generation  0.643 uIU/mL (0.358-3.74)   11/08/17  05:23    


 


Specimen Type  Catherized urine   11/12/17  00:59    


 


Urine Color  Pale yellow  (YELLOW)   11/12/17  00:59    


 


Urine Appearance  Clear  (CLEAR)   11/12/17  00:59    


 


Urine pH  6.0  (5.0 - 8.0)   11/12/17  00:59    


 


Ur Specific Gravity  1.020  (1.000-1.030)   11/12/17  00:59    


 


Urine Protein  Negative  (NEGATIVE)   11/12/17  00:59    


 


Urine Glucose (UA)  Negative  (NEGATIVE)   11/12/17  00:59    


 


Urine Ketones  Negative  (NEGATIVE)   11/12/17  00:59    


 


Urine Occult Blood  2+  (NEGATIVE)   11/12/17  00:59    


 


Urine Nitrite  Negative  (NEGATIVE)   11/12/17  00:59    


 


Urine Bilirubin  Negative  (NEGATIVE)   11/12/17  00:59    


 


Urine Urobilinogen  Normal  (NORMAL)   11/12/17  00:59    


 


Ur Leukocyte Esterase  Negative  (NEGATIVE)   11/12/17  00:59    


 


Urine RBC  Rare /HPF (NEGATIVE)   11/12/17  00:59    


 


Urine WBC  None seen /HPF (NEGATIVE)   11/12/17  00:59    


 


Ur Squamous Epith Cells  Negative /HPF (NEGATIVE)   11/12/17  00:59    


 


Urine Bacteria  Negative /HPF (NEGATIVE)   11/12/17  00:59    


 


Ur Culture Indicated?  No/not indicated   11/12/17  00:59    


 


VALE Screen  None detected  (None Detected)   11/08/17  05:23    


 


VALE Titer  TNP   11/08/17  05:23    


 


VALE Pattern  TNP   11/08/17  05:23    


 


AChR Muscle Binding Ab  0.2   11/08/17  05:23    


 


Striated Muscle IgG Ab  <1:40   11/08/17  05:23    


 


Acetylcholine Recept Ab  Cancelled   11/07/17  20:14    














- Plan


(1) Myasthenia gravis with (acute) exacerbation


Status: Suspected   


Plan: SOLU-MEDROL 125MG IV Q6H, MESTINON 60MG PO QID, CONTINUE TO MONITOR   





(2) Diplopia


Status: Acute   


Plan: CONTINUE TO MONITOR   





(3) Ptosis of eyelid, bilateral


Status: Acute   


Plan: CONTINUE TO MONITOR   





(4) Weakness generalized


Status: Acute   


Plan: PHYSICAL THERAPY, CONTINUE TO MONITOR

## 2018-02-16 ENCOUNTER — HOSPITAL ENCOUNTER (INPATIENT)
Dept: HOSPITAL 24 - ER | Age: 68
LOS: 1 days | Discharge: HOME | DRG: 310 | End: 2018-02-17
Attending: INTERNAL MEDICINE | Admitting: INTERNAL MEDICINE
Payer: COMMERCIAL

## 2018-02-16 VITALS — BODY MASS INDEX: 29.7 KG/M2

## 2018-02-16 DIAGNOSIS — I10: ICD-10-CM

## 2018-02-16 DIAGNOSIS — E87.6: ICD-10-CM

## 2018-02-16 DIAGNOSIS — R53.1: ICD-10-CM

## 2018-02-16 DIAGNOSIS — I48.0: Primary | ICD-10-CM

## 2018-02-16 DIAGNOSIS — R00.0: ICD-10-CM

## 2018-02-16 DIAGNOSIS — R94.31: ICD-10-CM

## 2018-02-16 LAB
ALBUMIN SERPL BCP-MCNC: 3.4 G/DL (ref 3.4–5)
ALP SERPL-CCNC: 62 UNITS/L (ref 46–116)
ALT SERPL W P-5'-P-CCNC: 18 UNITS/L (ref 12–78)
AST SERPL W P-5'-P-CCNC: 19 UNITS/L (ref 15–37)
BASOPHILS # BLD AUTO: 0 X10^3/UL (ref 0–0.1)
BASOPHILS NFR BLD AUTO: 0.8 % (ref 0.2–1)
BUN SERPL-MCNC: 8 MG/DL (ref 7–18)
CALCIUM ALBUM COR SERPL-SCNC: (no result) MG/DL (ref 8.5–10.1)
CALCIUM ALBUM COR SERPL-SCNC: 141 MMOL/L (ref 136–145)
CALCIUM SERPL-MCNC: 8.3 MG/DL (ref 8.5–10.1)
CHLORIDE SERPL-SCNC: 102 MMOL/L (ref 98–107)
CK MB SERPL-MCNC: < 1 NG/ML (ref 0–4)
CK SERPL-CCNC: 47 UNITS/L (ref 39–308)
CKMB %: 2.1 % (ref ?–4)
CO2 SERPL-SCNC: 29.5 MMOL/L (ref 21–32)
CREAT SERPL-MCNC: 1.24 MG/DL (ref 0.7–1.3)
EGFR  BLACK RACES: > 60 (ref 60–?)
EOSINOPHIL # BLD AUTO: 0 X10^3/UL (ref 0–0.2)
EOSINOPHIL NFR BLD AUTO: 0.4 % (ref 0.9–2.9)
ERYTHROCYTE [DISTWIDTH] IN BLOOD BY AUTOMATED COUNT: 14.1 % (ref 11.6–16.5)
HCT VFR BLD AUTO: 39.2 % (ref 42–54)
HGB BLD-MCNC: 13.4 G/DL (ref 13.5–18)
LYMPHOCYTES # BLD AUTO: 0.6 X10^3/UL (ref 1.3–2.9)
LYMPHOCYTES NFR BLD AUTO: 16.6 % (ref 21–51)
MAGNESIUM SERPL-MCNC: 1.7 MG/DL (ref 1.7–2.9)
MCH RBC QN AUTO: 27.5 PG (ref 27–34)
MCHC RBC AUTO-ENTMCNC: 34.1 G/DL (ref 33–35)
MCV RBC AUTO: 80.6 FL (ref 80–100)
MONOCYTES # BLD AUTO: 0.5 X10^3/UL (ref 0.3–0.8)
MONOCYTES NFR BLD AUTO: 12.2 % (ref 0–13)
NEUTROPHILS # BLD AUTO: 2.7 X10^3/UL (ref 2.2–4.8)
NEUTROPHILS NFR BLD AUTO: 70 % (ref 42–75)
PLATELET # BLD: 162 X10^3/UL (ref 150–450)
PMV BLD AUTO: 9.9 FL (ref 7.4–11)
PROT SERPL-MCNC: 7.3 G/DL (ref 6.4–8.2)
RBC # BLD AUTO: 4.86 X10^6/UL (ref 4.7–6)
SODIUM SERPL-SCNC: 141 MMOL/L (ref 136–145)
TROPONIN I SERPL-MCNC: 0.03 NG/ML (ref 0–1.5)
WBC NRBC COR # BLD AUTO: 3.9 X10^3/UL (ref 3.6–10)

## 2018-02-16 PROCEDURE — 80053 COMPREHEN METABOLIC PANEL: CPT

## 2018-02-16 PROCEDURE — 85730 THROMBOPLASTIN TIME PARTIAL: CPT

## 2018-02-16 PROCEDURE — 85610 PROTHROMBIN TIME: CPT

## 2018-02-16 PROCEDURE — 85378 FIBRIN DEGRADE SEMIQUANT: CPT

## 2018-02-16 PROCEDURE — 96365 THER/PROPH/DIAG IV INF INIT: CPT

## 2018-02-16 PROCEDURE — 93010 ELECTROCARDIOGRAM REPORT: CPT

## 2018-02-16 PROCEDURE — 85025 COMPLETE CBC W/AUTO DIFF WBC: CPT

## 2018-02-16 PROCEDURE — 94760 N-INVAS EAR/PLS OXIMETRY 1: CPT

## 2018-02-16 PROCEDURE — 71045 X-RAY EXAM CHEST 1 VIEW: CPT

## 2018-02-16 PROCEDURE — 84484 ASSAY OF TROPONIN QUANT: CPT

## 2018-02-16 PROCEDURE — 87040 BLOOD CULTURE FOR BACTERIA: CPT

## 2018-02-16 PROCEDURE — 93005 ELECTROCARDIOGRAM TRACING: CPT

## 2018-02-16 PROCEDURE — 83735 ASSAY OF MAGNESIUM: CPT

## 2018-02-16 PROCEDURE — 36415 COLL VENOUS BLD VENIPUNCTURE: CPT

## 2018-02-16 PROCEDURE — 96374 THER/PROPH/DIAG INJ IV PUSH: CPT

## 2018-02-16 PROCEDURE — 82550 ASSAY OF CK (CPK): CPT

## 2018-02-16 PROCEDURE — 82553 CREATINE MB FRACTION: CPT

## 2018-02-16 PROCEDURE — 96375 TX/PRO/DX INJ NEW DRUG ADDON: CPT

## 2018-02-16 PROCEDURE — 99284 EMERGENCY DEPT VISIT MOD MDM: CPT

## 2018-02-16 PROCEDURE — 84132 ASSAY OF SERUM POTASSIUM: CPT

## 2018-02-16 RX ADMIN — POTASSIUM CHLORIDE SCH MLS/HR: 2 INJECTION, SOLUTION, CONCENTRATE INTRAVENOUS at 09:08

## 2018-02-16 RX ADMIN — POTASSIUM CHLORIDE SCH MLS/HR: 2 INJECTION, SOLUTION, CONCENTRATE INTRAVENOUS at 16:19

## 2018-02-16 NOTE — DR.GENAD
HPI





- PCP


Primary Care Physician: MAIN





- Complaint/Symptoms


Chief Complaint Doctors Comments: This started today. There was plans for 

cardioversion back in November but he spontaneously converted to NSR. Rate 

control with Metoprolol and later with another agent "buttomed" both his BP and 

HR.


Chief Complaint:: PTC/O WEAKNESS AND HEART RACING. PT STATES HE HAS WENT INTO 

AFIB AND VTACH PREVIOUSLY WHEN HE WAS IN THE HOSPITAL, BUT IS NOT CURRENTLY 

NEEDING TREATMENT FOR THESE. 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





- Source


History Provided: Patient





- Mode of Arrival


Mode of Arrival: Wheelchair





- Timing


Onset of Chief Complaint: 02/16/18





<JASE FRASER - Last Filed: 02/16/18 08:10>





PMH





- PMH


Past Medical History: Yes


Past Medical History: Hypertension


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


Past Surgical History: No


Surgical History: No History





- Family History


History of Family Medical Conditions: Yes


Family Medical History: Diabetes Mellitus, Cancer, Coronary Artery Disease, 

Hypertension





- Social History


Does any household member use tobacco: No


Alcohol Use: None


Do you use any recreational Drugs:: No


Lives With: Family


Lives Where: Home





- infectious screening


In the last 2 months have you had wt loss of >10#?: NO


Have you had fever, night sweats or hemotysis?: No


Have you traveled outside the country in the last 6 months?: No


Isolation: Standard





<JASE FRASER - Last Filed: 02/16/18 08:10>





ROS





- Review of Systems


Constitutional: No Symptoms Reported


Eyes: No Symptoms Reported


ENTM: No Symptoms Reported


Respiratoy: No Symptoms Reported


Cardiovascular: Palpitations


Gastrointestinal/Abdominal: No Symptoms Reported


Genitourinary: No Symptoms Reported


Neurological: No Symptoms Reported


Musculoskeletal: No Symptoms Reported


Integumentary: No Symptoms Reported


Hematologic/Lymphatic: No Symptoms Reported


Endocrine: No Symptoms Reported


Psychiatric: No Symptoms Reported





<JASE FRASER - Last Filed: 02/16/18 08:10>





PE





- General


Limitations: No Limitations


General Appearance: Alert, In No Apparent Distress





- Head


Head Exam: Normal Inspection





- Eyes


Eye exam: Normal Appearance





- ENT


ENT Exam: Normal Exam





- Neck


Neck Exam: Normal Inspection





- Chest


Chest Inspection: Normal Inspection





- Respiratory


Respiratory Exam: Normal Lung Sounds Bilat





- Cardiovascular


Cardiovascular Exam: Tachycardia, Irregular Rhythm (A. fib), +S1, +S2





- Abdominal Exam


Abdominal Exam: Normal Inspection, Normal Bowel Sounds, Soft





- Extremities


Extremities Exam: Normal Inspection





- Back


Back Exam: Normal Inspection





- Neurologic


Neurological Exam: Alert, Oriented X3





- Psychiatric


Psychiatric Exam: Normal Affect, Normal Mood





- Skin


Skin Exam: Warm, Dry, Intact, Normal Color





<JASE FRASER - Last Filed: 02/16/18 08:10>





- Vital Signs


Vitals: 


 





Temperature                      97.7 F


Pulse Rate [Apical]              128


Pulse Rate                       131


Respiratory Rate                 17


Blood Pressure [Left Arm]        116/70


Blood Pressure                   92/64


O2 Sat by Pulse Oximetry         98











Course





- Reevaluation


1st: Improved





<JASE FRASER - Last Filed: 02/16/18 08:10>





- Consultation


Called: 07:50 (Dr Rutledge admitted for further management)





<RAH WALTERS - Last Filed: 02/16/18 09:11>





ROR





- Labs Reviewed


Result Diagrams: 


 02/16/18 07:15





 02/16/18 07:15





<JASE FRASER - Last Filed: 02/16/18 08:10>





- Labs Reviewed


Result Diagrams: 


 02/16/18 07:15





 02/16/18 07:15





- XRAY


XRAY Interpreted by: Radiologist (Chest: clear)





<RAH WALTERS - Last Filed: 02/16/18 09:11>





- Labs Reviewed


Laboratory: 


 











WBC  3.9 X10^3/uL (3.6-10.0)   02/16/18  07:15    


 


RBC  4.86 X10^6/uL (4.7-6.0)   02/16/18  07:15    


 


Hgb  13.4 g/dL (13.5-18.0)  L  02/16/18  07:15    


 


Hct  39.2 % (42.0-54.0)  L  02/16/18  07:15    


 


MCV  80.6 fL (80.0-100.0)   02/16/18  07:15    


 


MCH  27.5 pg (27.0-34.0)   02/16/18  07:15    


 


MCHC  34.1 g/dL (33.0-35.0)   02/16/18  07:15    


 


RDW  14.1 % (11.6-16.5)   02/16/18  07:15    


 


Plt Count  162 X10^3/uL (150.0-450.0)   02/16/18  07:15    


 


MPV  9.9 fL (7.4-11.0)   02/16/18  07:15    


 


Neut %  70.0 % (42.0-75.0)   02/16/18  07:15    


 


Lymph %  16.6 % (21.0-51.0)  L  02/16/18  07:15    


 


Mono %  12.2 % (0.0-13.0)   02/16/18  07:15    


 


Eos %  0.4 % (0.9-2.9)  L  02/16/18  07:15    


 


Baso %  0.8 % (0.2-1.0)   02/16/18  07:15    


 


Neut #  2.7 x10^3/uL (2.2-4.8)   02/16/18  07:15    


 


Lymph #  0.6 X10^3/uL (1.3-2.9)  L  02/16/18  07:15    


 


Mono #  0.5 x10^3/uL (0.3-0.8)   02/16/18  07:15    


 


Eos #  0.0 x10^3/uL (0.0-0.2)   02/16/18  07:15    


 


Baso #  0.0 X10^3/uL (0.0-0.1)   02/16/18  07:15    


 


Absolute Nucleated RBC  0.1 /100WBC  02/16/18  07:15    


 


INR Target Range  -   02/16/18  07:15    


 


INR  1.09  (0.8-1.3)   02/16/18  07:15    


 


PTT  30.3 SECONDS (22.9-36.5)   02/16/18  07:15    


 


PTT Comment  -   02/16/18  07:15    


 


D-Dimer  197 ng/mL (0-400)   02/16/18  07:20    


 


Sodium  141 mmol/L (136-145)   02/16/18  07:15    


 


Corrected Sodium  141 mmol/L (136-145)   02/16/18  07:15    


 


Potassium  2.9 mmol/L (3.5-5.1)  L*  02/16/18  07:15    


 


Chloride  102 mmol/L ()   02/16/18  07:15    


 


Carbon Dioxide  29.5 mmol/L (21-32)   02/16/18  07:15    


 


BUN  8 mg/dL (7-18)   02/16/18  07:15    


 


Creatinine  1.24 mg/dL (0.70-1.30)   02/16/18  07:15    


 


Est GFR (MDRD) Af Amer  > 60  (>60)   02/16/18  07:15    


 


Est GFR (MDRD) Non-Af  > 60  (>60)   02/16/18  07:15    


 


Glucose  111 mg/dL (65-99)  H  02/16/18  07:15    


 


Calcium  8.3 mg/dL (8.5-10.1)  L  02/16/18  07:15    


 


Corrected Calcium  TNP   02/16/18  07:15    


 


Magnesium  1.7 mg/dL (1.7-2.9)   02/16/18  07:15    


 


Total Bilirubin  0.30 mg/dL (0.2-1.0)   02/16/18  07:15    


 


AST  19 Units/L (15-37)   02/16/18  07:15    


 


ALT  18 Units/L (12-78)   02/16/18  07:15    


 


Alkaline Phosphatase  62 Units/L ()   02/16/18  07:15    


 


Creatine Kinase  47 Units/L ()   02/16/18  07:15    


 


CK-MB (CK-2)  < 1.0 ng/mL (0-4.0)   02/16/18  07:15    


 


CK/CKMB % Calc  2.1 % (<4)   02/16/18  07:15    


 


Troponin I  0.03 ng/mL (0-1.5)   02/16/18  07:15    


 


Total Protein  7.3 g/dL (6.4-8.2)   02/16/18  07:15    


 


Albumin  3.4 g/dL (3.4-5.0)   02/16/18  07:15    


 


Globulin  3.9 g/dL (2.5-4.5)   02/16/18  07:15    


 


Albumin/Globulin Ratio  0.9 Ratio (1.1-2.1)  L  02/16/18  07:15    














<JASE FRASER - Last Filed: 02/16/18 08:10>





<RAH WALTERS - Last Filed: 02/16/18 09:11>





- Diagnosis


Discharge Problem: 


 Hypokalemia





A-fib


Qualifiers:


 Atrial fibrillation type: paroxysmal Qualified Code(s): I48.0 - Paroxysmal 

atrial fibrillation








- Discharge Plan


Condition: Stable





- Follow ups/Referrals


Follow ups/Referrals: 


Nagi Rutledge [Primary Care Provider] - 3 days





- Instructions

## 2018-02-16 NOTE — RAD
HISTORY:  Weakness, tachycardia and



Study:  Chest AP portable



Comparison: 11/12/2017







Findings:



The heart is within normal limits in size. The mallory are normal. The aorta is calcified. The lung fiel
ds are clear. No pleural effusions are identified. The bony thorax is unremarkable.



IMPRESSION:

 

Lungs clear







Reported By:Electronically Signed by PRETTY LEDBETTER MD at 2/16/2018 7:42:38 AM

## 2018-02-16 NOTE — DR.H&P
H&P





- History & Physical for Day of:


H&P Date: 02/16/18





- Chief Complaint


Chief Complaint: WEAKNESS, TACHYCARDIA





- Allergies


Allergies/Adverse Reactions: 


 Allergies











Allergy/AdvReac Type Severity Reaction Status Date / Time


 


No Known Drug Allergies Allergy   Verified 02/16/18 07:20














- History of Present Illness


History of Present Illness:  IS A 68 YEAR OLD PATIENT OF OURS WHO 

PRESENTED TO THE EMERGENCY ROOM WITH COMPLAINTS OF WEAKNESS AND HEART RACING. 

PATIENT REPORTS THAT HE HAD A SIMILAR EPISODE WHEN HE WAS HOSPITALIZED A FEW 

MONTHS AGO. PATIENT REPORTS THAT AT THAT TIME, HE WENT INTO ATRIAL FIBRILLATION 

AND VENTRICULAR TACHYCARDIA, BUT SPONTANEOUSLY CONVERTED WITHOUT THE NEED FOR 

CARDIOVERSION. HE IS NOT CURRENTLY RECEIVING ANY TREATMENT FOR SAID CONDITIONS. 

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 PATIENT WAS PLACED 

ON THE CARDIAC MONITOR WHERE HE WAS FOUND TO BE IN ATRIAL FIBRILLATION WITH HR 

IN THE 130S. ON ARRIVAL, VITALS WERE 97.7-131-23-99%-92/64. LABS WERE OBTAINED. 

ABNORMAL LAB VALUES INCLUDE THE FOLLOWING: HGB 13.4, HCT 39.2, POTASSIUM 2.9, 

GLUCOSE 111, CALCIUM 8.3. A CHEST XRAY WAS OBTAINED AND REPORTED CLEAR LUNGS. 

HE WAS GIVEN ADENOSINE 6MG IV X 1 AND A NORMAL SALINE BOLUS. AFTER NO 

CONVERSION WAS NOTED, PATIENT WAS STARTED ON A CARDIZEM DRIP. POTASSIUM 

SUPPLEMENTS WERE ALSO GIVEN DUE TO DECREASED POTASSIUM. PATIENT WAS ADMITTED TO 

THE INTENSIVE CARE UNIT FOR FURTHER TREATMENT AND EVALUATION. HE WAS STARTED ON 

NORMAL SALINE AT 125ML/HR AND WILL CONTINUE ON CARDIZEM DRIP. WE PLAN TO FOLLOW 

UP WITH AM LABS AND CONTINUE TO MONITOR PATIENT.





- Past Medical History


Past Medical History: Hypertension


Additional Medical History: GUILLIAN BARRE SYNDROME





- Past Surgical History


Surgical History: No History





- Family History


Family Medical History: Diabetes Mellitus, Heart Failure, Hypertension





- Social History


Does patient currently use any type of tobacco product: No


Have you used tobacco products in the last 12 months: No


Type of Tobacco Use: Cigarettes


How many years tobacco product used: 25


Does any household member use tobacco: No


Alcohol Use: None


Drug Use: None





- Medications


Home Medications: 


 





Aspirin EC [ASPIRIN EC 81 MG *] 1 tab PO DAILY 02/16/18 [History Confirmed 02/16 /18]


Losartan Potassium & Hydrochlo [HYZAAR 50/12.5 MG *] 1 tab PO DAILY 02/16/18 [

History Confirmed 02/16/18]











- Review of Systems


Constitutional: Weakness.  denies: Fever, Chills, Malaise


Eyes: No Symptoms Reported.  denies: See HPI, Pain, Vision Change, Conjunctivae 

Inflammation, Eyelid Inflammation, Redness, Other


ENT: No Symptoms Reported.  denies: See HPI, Ear Pain, Ear Discharge, Nose Pain

, Nose Discharge, Nose Congestion, Mouth Pain, Mouth Swelling, Throat Pain, 

Throat Swelling, Other


Respiratory: Shortness of Breath


Cardiovascular: Palpitations, Light Headedness


Gastrointestinal: No Symptoms Reported.  denies: See HPI, Nausea, Vomiting, 

Abdominal Pain, Diarrhea, Constipation, Melena, Hematochezia, Other


Genitourinary: No Symptoms Reported.  denies: See HPI, Dysuria, Frequency, 

Incontinence, Hematuria, Retention, Other


Musculoskeletal: No Symptoms Reported.  denies: See HPI, Shoulder Pain, Arm Pain

, Back Pain, Hand Pain, Leg Pain, Foot Pain, Neck Pain, Other


Skin: denies: No Symptoms Reported, See HPI, Rash, Lesions, Jaundice, Bruising, 

Wound, Ecchymosis, Other


Neurological: Weakness





- Physical Exam


Vital Signs: 


 





Temperature                      99.2 F


Pulse Rate [Apical]              58


Pulse Rate                       131


Respiratory Rate                 16


Blood Pressure [Left Arm]        115/55


Blood Pressure                   92/64


O2 Sat by Pulse Oximetry         95








Oriented: Normal


Eyes: Normal.  negative: Blurred Vision, Diplopia, Discharge, Pain, Redness, 

Photophobia, Other


Ear: Normal.  negative: Right, Left, Swelling, Ecchymosis, Hemotypanum, Abrasion

, Laceration


Nose: negative: Normal, Injected, Discharge, Blood, Other


Throat: Normal.  negative: Tonsillar Hypertrophy, Red, Exudate, Dry, Other


Respiratory: Clear Throughout


Cardiovascular: Tachycardia, Irregular.  negative: S3, S4, Murmur


: Normal


Auscultation: Bowel Sounds: Normal


Palpation: Normal.  negative: Spleen Enlarged, Liver Enlarged, Mass Pulsatile, 

Other


Tenderness: Normal.  negative: Rebound, Guarding, Rigidity


Skin: Normal


Musculoskeletal: Normal


Psychiatric: Normal


Mood Description: Calm


Affect: Normal


Speech Pattern: Clear





- Assessment/Plan


(1) A-fib


Qualifiers: 


   Atrial fibrillation type: paroxysmal   Qualified Code(s): I48.0 - Paroxysmal 

atrial fibrillation   


Status: Acute   


Plan: CARDIZEM DRIP, CARDIAC MONITOR, CONTINUE TO MONITOR

## 2018-02-17 VITALS — SYSTOLIC BLOOD PRESSURE: 124 MMHG | DIASTOLIC BLOOD PRESSURE: 59 MMHG

## 2018-02-17 LAB
ALBUMIN SERPL BCP-MCNC: 2.7 G/DL (ref 3.4–5)
ALP SERPL-CCNC: 47 UNITS/L (ref 46–116)
ALT SERPL W P-5'-P-CCNC: 15 UNITS/L (ref 12–78)
AST SERPL W P-5'-P-CCNC: 16 UNITS/L (ref 15–37)
BASOPHILS # BLD AUTO: 0 X10^3/UL (ref 0–0.1)
BASOPHILS NFR BLD AUTO: 0.8 % (ref 0.2–1)
BUN SERPL-MCNC: 8 MG/DL (ref 7–18)
CALCIUM ALBUM COR SERPL-SCNC: (no result) MMOL/L (ref 136–145)
CALCIUM ALBUM COR SERPL-SCNC: 8.8 MG/DL (ref 8.5–10.1)
CALCIUM SERPL-MCNC: 7.8 MG/DL (ref 8.5–10.1)
CHLORIDE SERPL-SCNC: 108 MMOL/L (ref 98–107)
CO2 SERPL-SCNC: 28.9 MMOL/L (ref 21–32)
CREAT SERPL-MCNC: 0.86 MG/DL (ref 0.7–1.3)
EGFR  BLACK RACES: > 60 (ref 60–?)
EOSINOPHIL # BLD AUTO: 0 X10^3/UL (ref 0–0.2)
EOSINOPHIL NFR BLD AUTO: 0.4 % (ref 0.9–2.9)
ERYTHROCYTE [DISTWIDTH] IN BLOOD BY AUTOMATED COUNT: 14.5 % (ref 11.6–16.5)
HCT VFR BLD AUTO: 34 % (ref 42–54)
HGB BLD-MCNC: 11.7 G/DL (ref 13.5–18)
LYMPHOCYTES # BLD AUTO: 0.7 X10^3/UL (ref 1.3–2.9)
LYMPHOCYTES NFR BLD AUTO: 30 % (ref 21–51)
MCH RBC QN AUTO: 27.4 PG (ref 27–34)
MCHC RBC AUTO-ENTMCNC: 34.3 G/DL (ref 33–35)
MCV RBC AUTO: 79.9 FL (ref 80–100)
MONOCYTES # BLD AUTO: 0.4 X10^3/UL (ref 0.3–0.8)
MONOCYTES NFR BLD AUTO: 15 % (ref 0–13)
NEUTROPHILS # BLD AUTO: 1.3 X10^3/UL (ref 2.2–4.8)
NEUTROPHILS NFR BLD AUTO: 53.8 % (ref 42–75)
PLATELET # BLD: 122 X10^3/UL (ref 150–450)
PMV BLD AUTO: 9.9 FL (ref 7.4–11)
PROT SERPL-MCNC: 6.3 G/DL (ref 6.4–8.2)
RBC # BLD AUTO: 4.25 X10^6/UL (ref 4.7–6)
SODIUM SERPL-SCNC: 143 MMOL/L (ref 136–145)
WBC NRBC COR # BLD AUTO: 2.5 X10^3/UL (ref 3.6–10)

## 2018-02-17 RX ADMIN — POTASSIUM CHLORIDE SCH MLS/HR: 2 INJECTION, SOLUTION, CONCENTRATE INTRAVENOUS at 00:23

## 2018-02-17 RX ADMIN — POTASSIUM CHLORIDE SCH MLS/HR: 2 INJECTION, SOLUTION, CONCENTRATE INTRAVENOUS at 08:41
